# Patient Record
Sex: MALE | Race: WHITE | NOT HISPANIC OR LATINO
[De-identification: names, ages, dates, MRNs, and addresses within clinical notes are randomized per-mention and may not be internally consistent; named-entity substitution may affect disease eponyms.]

---

## 2017-02-16 ENCOUNTER — APPOINTMENT (OUTPATIENT)
Dept: RADIOLOGY | Facility: HOSPITAL | Age: 52
End: 2017-02-16

## 2017-02-16 ENCOUNTER — OUTPATIENT (OUTPATIENT)
Dept: OUTPATIENT SERVICES | Facility: HOSPITAL | Age: 52
LOS: 1 days | End: 2017-02-16
Payer: COMMERCIAL

## 2017-02-16 PROBLEM — Z00.00 ENCOUNTER FOR PREVENTIVE HEALTH EXAMINATION: Status: ACTIVE | Noted: 2017-02-16

## 2017-02-16 PROCEDURE — 71046 X-RAY EXAM CHEST 2 VIEWS: CPT

## 2017-02-16 PROCEDURE — 71020: CPT | Mod: 26

## 2017-07-06 ENCOUNTER — INPATIENT (INPATIENT)
Facility: HOSPITAL | Age: 52
LOS: 0 days | Discharge: HOME | DRG: 313 | End: 2017-07-07
Attending: HOSPITALIST | Admitting: HOSPITALIST
Payer: COMMERCIAL

## 2017-07-06 DIAGNOSIS — R07.89 OTHER CHEST PAIN: ICD-10-CM

## 2017-07-06 DIAGNOSIS — L40.9 PSORIASIS, UNSPECIFIED: ICD-10-CM

## 2017-07-06 DIAGNOSIS — F41.9 ANXIETY DISORDER, UNSPECIFIED: ICD-10-CM

## 2017-07-06 DIAGNOSIS — D72.829 ELEVATED WHITE BLOOD CELL COUNT, UNSPECIFIED: ICD-10-CM

## 2017-07-06 DIAGNOSIS — R07.9 CHEST PAIN, UNSPECIFIED: ICD-10-CM

## 2017-07-06 DIAGNOSIS — F17.210 NICOTINE DEPENDENCE, CIGARETTES, UNCOMPLICATED: ICD-10-CM

## 2017-07-06 PROCEDURE — 93306 TTE W/DOPPLER COMPLETE: CPT | Mod: 26

## 2017-07-06 PROCEDURE — 93010 ELECTROCARDIOGRAM REPORT: CPT | Mod: 59

## 2017-07-06 PROCEDURE — 99284 EMERGENCY DEPT VISIT MOD MDM: CPT

## 2017-07-06 PROCEDURE — 71010: CPT | Mod: 26

## 2017-07-06 PROCEDURE — 99223 1ST HOSP IP/OBS HIGH 75: CPT

## 2017-07-06 PROCEDURE — 71275 CT ANGIOGRAPHY CHEST: CPT | Mod: 26

## 2017-07-07 PROCEDURE — 99285 EMERGENCY DEPT VISIT HI MDM: CPT | Mod: 25

## 2017-07-07 PROCEDURE — 85379 FIBRIN DEGRADATION QUANT: CPT

## 2017-07-07 PROCEDURE — 83880 ASSAY OF NATRIURETIC PEPTIDE: CPT

## 2017-07-07 PROCEDURE — A9500: CPT

## 2017-07-07 PROCEDURE — 96374 THER/PROPH/DIAG INJ IV PUSH: CPT

## 2017-07-07 PROCEDURE — 93306 TTE W/DOPPLER COMPLETE: CPT

## 2017-07-07 PROCEDURE — 84484 ASSAY OF TROPONIN QUANT: CPT

## 2017-07-07 PROCEDURE — G0378: CPT

## 2017-07-07 PROCEDURE — 82553 CREATINE MB FRACTION: CPT

## 2017-07-07 PROCEDURE — 85610 PROTHROMBIN TIME: CPT

## 2017-07-07 PROCEDURE — 78452 HT MUSCLE IMAGE SPECT MULT: CPT | Mod: 26

## 2017-07-07 PROCEDURE — 82550 ASSAY OF CK (CPK): CPT

## 2017-07-07 PROCEDURE — 93016 CV STRESS TEST SUPVJ ONLY: CPT

## 2017-07-07 PROCEDURE — 85027 COMPLETE CBC AUTOMATED: CPT

## 2017-07-07 PROCEDURE — 93005 ELECTROCARDIOGRAM TRACING: CPT

## 2017-07-07 PROCEDURE — 71045 X-RAY EXAM CHEST 1 VIEW: CPT

## 2017-07-07 PROCEDURE — 80048 BASIC METABOLIC PNL TOTAL CA: CPT

## 2017-07-07 PROCEDURE — 71275 CT ANGIOGRAPHY CHEST: CPT

## 2017-07-07 PROCEDURE — 83735 ASSAY OF MAGNESIUM: CPT

## 2017-07-07 PROCEDURE — 85730 THROMBOPLASTIN TIME PARTIAL: CPT

## 2017-07-07 PROCEDURE — 80061 LIPID PANEL: CPT

## 2017-07-07 PROCEDURE — 84443 ASSAY THYROID STIM HORMONE: CPT

## 2017-07-07 PROCEDURE — 93018 CV STRESS TEST I&R ONLY: CPT

## 2017-07-07 PROCEDURE — 78452 HT MUSCLE IMAGE SPECT MULT: CPT

## 2017-07-07 PROCEDURE — 93017 CV STRESS TEST TRACING ONLY: CPT

## 2017-07-07 PROCEDURE — 80053 COMPREHEN METABOLIC PANEL: CPT

## 2017-07-07 PROCEDURE — 99238 HOSP IP/OBS DSCHRG MGMT 30/<: CPT

## 2017-07-07 PROCEDURE — 83036 HEMOGLOBIN GLYCOSYLATED A1C: CPT

## 2017-09-14 ENCOUNTER — OUTPATIENT (OUTPATIENT)
Dept: OUTPATIENT SERVICES | Facility: HOSPITAL | Age: 52
LOS: 1 days | End: 2017-09-14
Payer: COMMERCIAL

## 2017-09-14 ENCOUNTER — APPOINTMENT (OUTPATIENT)
Dept: MRI IMAGING | Facility: HOSPITAL | Age: 52
End: 2017-09-14

## 2017-09-14 ENCOUNTER — APPOINTMENT (OUTPATIENT)
Dept: CT IMAGING | Facility: HOSPITAL | Age: 52
End: 2017-09-14
Payer: COMMERCIAL

## 2017-09-14 DIAGNOSIS — Z00.8 ENCOUNTER FOR OTHER GENERAL EXAMINATION: ICD-10-CM

## 2017-09-14 DIAGNOSIS — D35.02 BENIGN NEOPLASM OF LEFT ADRENAL GLAND: ICD-10-CM

## 2017-09-14 PROCEDURE — 74170 CT ABD WO CNTRST FLWD CNTRST: CPT

## 2017-09-14 PROCEDURE — 74170 CT ABD WO CNTRST FLWD CNTRST: CPT | Mod: 26

## 2018-01-09 ENCOUNTER — EMERGENCY (EMERGENCY)
Facility: HOSPITAL | Age: 53
LOS: 1 days | Discharge: HOME | End: 2018-01-09
Admitting: EMERGENCY MEDICINE
Payer: COMMERCIAL
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
## 2018-01-09 DIAGNOSIS — F17.200 NICOTINE DEPENDENCE, UNSPECIFIED, UNCOMPLICATED: ICD-10-CM
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
## 2018-01-09 DIAGNOSIS — Z79.899 OTHER LONG TERM (CURRENT) DRUG THERAPY: ICD-10-CM
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
## 2018-01-09 DIAGNOSIS — S01.452A OPEN BITE OF LEFT CHEEK AND TEMPOROMANDIBULAR AREA, INITIAL ENCOUNTER: ICD-10-CM

## 2018-01-09 DIAGNOSIS — S01.81XA LACERATION WITHOUT FOREIGN BODY OF OTHER PART OF HEAD, INITIAL ENCOUNTER: ICD-10-CM

## 2018-01-09 PROCEDURE — 12011 RPR F/E/E/N/L/M 2.5 CM/<: CPT

## 2018-01-09 PROCEDURE — 99284 EMERGENCY DEPT VISIT MOD MDM: CPT

## 2018-01-09 PROCEDURE — 99284 EMERGENCY DEPT VISIT MOD MDM: CPT | Mod: 25

## 2018-01-09 PROCEDURE — 96374 THER/PROPH/DIAG INJ IV PUSH: CPT | Mod: XU

## 2018-01-10 ENCOUNTER — APPOINTMENT (OUTPATIENT)
Dept: INFECTIOUS DISEASE | Facility: CLINIC | Age: 53
End: 2018-01-10
Payer: COMMERCIAL

## 2018-01-10 VITALS
SYSTOLIC BLOOD PRESSURE: 166 MMHG | WEIGHT: 206 LBS | HEART RATE: 108 BPM | OXYGEN SATURATION: 92 % | HEIGHT: 71 IN | TEMPERATURE: 97.6 F | RESPIRATION RATE: 18 BRPM | BODY MASS INDEX: 28.84 KG/M2 | DIASTOLIC BLOOD PRESSURE: 94 MMHG

## 2018-01-10 DIAGNOSIS — F32.9 MAJOR DEPRESSIVE DISORDER, SINGLE EPISODE, UNSPECIFIED: ICD-10-CM

## 2018-01-10 DIAGNOSIS — F17.200 NICOTINE DEPENDENCE, UNSPECIFIED, UNCOMPLICATED: ICD-10-CM

## 2018-01-10 DIAGNOSIS — R21 RASH AND OTHER NONSPECIFIC SKIN ERUPTION: ICD-10-CM

## 2018-01-10 PROCEDURE — 99203 OFFICE O/P NEW LOW 30 MIN: CPT

## 2018-01-10 RX ORDER — SECUKINUMAB 150 MG/ML
INJECTION SUBCUTANEOUS
Refills: 0 | Status: ACTIVE | COMMUNITY

## 2018-01-10 RX ORDER — PHENELZINE SULFATE 15 MG/1
15 TABLET, FILM COATED ORAL
Refills: 0 | Status: ACTIVE | COMMUNITY

## 2018-01-10 RX ORDER — METHYLPHENIDATE HYDROCHLORIDE 10 MG/1
10 TABLET ORAL
Refills: 0 | Status: ACTIVE | COMMUNITY

## 2018-01-10 RX ORDER — CLONAZEPAM 2 MG/1
TABLET ORAL
Refills: 0 | Status: ACTIVE | COMMUNITY

## 2018-01-12 LAB
R RICKETTSI IGG CSF-ACNC: NEGATIVE
R RICKETTSI IGM CSF-ACNC: 0.14 INDEX
T PALLIDUM AB SER QL IA: NEGATIVE

## 2018-01-16 LAB
B HENSELAE IGG SER-ACNC: NORMAL TITER
B HENSELAE IGM SER QL: NORMAL TITER
B QUINTANA IGG SER QL: NORMAL TITER
B QUINTANA IGM SER QL: NORMAL TITER

## 2018-01-18 ENCOUNTER — MOBILE ON CALL (OUTPATIENT)
Age: 53
End: 2018-01-18

## 2018-01-18 ENCOUNTER — LABORATORY RESULT (OUTPATIENT)
Age: 53
End: 2018-01-18

## 2018-01-18 ENCOUNTER — APPOINTMENT (OUTPATIENT)
Dept: DERMATOLOGY | Facility: CLINIC | Age: 53
End: 2018-01-18
Payer: COMMERCIAL

## 2018-01-18 VITALS
SYSTOLIC BLOOD PRESSURE: 140 MMHG | DIASTOLIC BLOOD PRESSURE: 80 MMHG | WEIGHT: 204 LBS | HEIGHT: 71 IN | BODY MASS INDEX: 28.56 KG/M2

## 2018-01-18 DIAGNOSIS — Z87.2 PERSONAL HISTORY OF DISEASES OF THE SKIN AND SUBCUTANEOUS TISSUE: ICD-10-CM

## 2018-01-18 DIAGNOSIS — F41.9 ANXIETY DISORDER, UNSPECIFIED: ICD-10-CM

## 2018-01-18 PROCEDURE — 11100 BX SKIN SUBCUTANEOUS&/MUCOUS MEMBRANE 1 LESION: CPT

## 2018-01-18 PROCEDURE — 11101 BIOPSY SKIN SUBQ&/MUCOUS MEMBRANE EA ADDL LESN: CPT

## 2018-01-18 PROCEDURE — 99203 OFFICE O/P NEW LOW 30 MIN: CPT | Mod: 25

## 2018-01-18 RX ORDER — TESTOSTERONE ENANTHATE 200 MG/ML
200 INJECTION, SOLUTION INTRAMUSCULAR
Qty: 5 | Refills: 0 | Status: ACTIVE | COMMUNITY
Start: 2017-07-18

## 2018-01-18 RX ORDER — HYDROXYZINE HYDROCHLORIDE 25 MG/1
25 TABLET ORAL
Qty: 30 | Refills: 0 | Status: ACTIVE | COMMUNITY
Start: 2017-11-22

## 2018-01-18 RX ORDER — PHENELZINE SULFATE 15 MG/1
TABLET, FILM COATED ORAL
Refills: 0 | Status: ACTIVE | COMMUNITY

## 2018-02-01 ENCOUNTER — MOBILE ON CALL (OUTPATIENT)
Age: 53
End: 2018-02-01

## 2018-02-01 ENCOUNTER — APPOINTMENT (OUTPATIENT)
Dept: DERMATOLOGY | Facility: CLINIC | Age: 53
End: 2018-02-01
Payer: COMMERCIAL

## 2018-02-01 DIAGNOSIS — L30.9 DERMATITIS, UNSPECIFIED: ICD-10-CM

## 2018-02-01 PROCEDURE — 99214 OFFICE O/P EST MOD 30 MIN: CPT

## 2018-02-28 RX ORDER — MYCOPHENOLATE MOFETIL 250 MG/1
CAPSULE ORAL
Refills: 0 | Status: DISCONTINUED | COMMUNITY
End: 2018-02-28

## 2018-03-01 RX ORDER — MOMETASONE FUROATE 1 MG/G
0.1 CREAM TOPICAL
Qty: 1 | Refills: 1 | Status: ACTIVE | COMMUNITY
Start: 2018-02-01 | End: 1900-01-01

## 2018-04-02 ENCOUNTER — APPOINTMENT (OUTPATIENT)
Dept: DERMATOLOGY | Facility: CLINIC | Age: 53
End: 2018-04-02

## 2018-04-04 ENCOUNTER — APPOINTMENT (OUTPATIENT)
Dept: DERMATOLOGY | Facility: CLINIC | Age: 53
End: 2018-04-04

## 2018-04-06 ENCOUNTER — APPOINTMENT (OUTPATIENT)
Dept: DERMATOLOGY | Facility: CLINIC | Age: 53
End: 2018-04-06

## 2018-04-09 ENCOUNTER — APPOINTMENT (OUTPATIENT)
Dept: DERMATOLOGY | Facility: CLINIC | Age: 53
End: 2018-04-09

## 2019-02-21 ENCOUNTER — APPOINTMENT (OUTPATIENT)
Age: 54
End: 2019-02-21
Payer: COMMERCIAL

## 2019-02-21 ENCOUNTER — OUTPATIENT (OUTPATIENT)
Dept: OUTPATIENT SERVICES | Facility: HOSPITAL | Age: 54
LOS: 1 days | End: 2019-02-21
Payer: COMMERCIAL

## 2019-02-21 DIAGNOSIS — Z00.8 ENCOUNTER FOR OTHER GENERAL EXAMINATION: ICD-10-CM

## 2019-02-21 PROCEDURE — 76870 US EXAM SCROTUM: CPT | Mod: 26

## 2019-02-21 PROCEDURE — 76870 US EXAM SCROTUM: CPT

## 2019-02-25 ENCOUNTER — APPOINTMENT (OUTPATIENT)
Dept: CT IMAGING | Facility: CLINIC | Age: 54
End: 2019-02-25

## 2019-05-30 ENCOUNTER — EMERGENCY (EMERGENCY)
Facility: HOSPITAL | Age: 54
LOS: 1 days | Discharge: HOME | End: 2019-05-30
Attending: EMERGENCY MEDICINE | Admitting: EMERGENCY MEDICINE
Payer: COMMERCIAL

## 2019-05-30 ENCOUNTER — TRANSCRIPTION ENCOUNTER (OUTPATIENT)
Age: 54
End: 2019-05-30

## 2019-05-30 VITALS
DIASTOLIC BLOOD PRESSURE: 76 MMHG | RESPIRATION RATE: 18 BRPM | SYSTOLIC BLOOD PRESSURE: 130 MMHG | TEMPERATURE: 98 F | OXYGEN SATURATION: 98 % | HEART RATE: 89 BPM

## 2019-05-30 VITALS
OXYGEN SATURATION: 95 % | HEIGHT: 69 IN | TEMPERATURE: 99 F | SYSTOLIC BLOOD PRESSURE: 150 MMHG | RESPIRATION RATE: 18 BRPM | DIASTOLIC BLOOD PRESSURE: 82 MMHG | WEIGHT: 188.94 LBS | HEART RATE: 92 BPM

## 2019-05-30 LAB
ALBUMIN SERPL ELPH-MCNC: 3.3 G/DL — SIGNIFICANT CHANGE UP (ref 3.3–5)
ALP SERPL-CCNC: 93 U/L — SIGNIFICANT CHANGE UP (ref 40–120)
ALT FLD-CCNC: 20 U/L DA — SIGNIFICANT CHANGE UP (ref 10–45)
ANION GAP SERPL CALC-SCNC: 11 MMOL/L — SIGNIFICANT CHANGE UP (ref 5–17)
APPEARANCE UR: CLEAR — SIGNIFICANT CHANGE UP
APTT BLD: 34.7 SEC — SIGNIFICANT CHANGE UP (ref 27.5–36.3)
AST SERPL-CCNC: 18 U/L — SIGNIFICANT CHANGE UP (ref 10–40)
BASOPHILS # BLD AUTO: 0.06 K/UL — SIGNIFICANT CHANGE UP (ref 0–0.2)
BASOPHILS NFR BLD AUTO: 0.4 % — SIGNIFICANT CHANGE UP (ref 0–2)
BILIRUB SERPL-MCNC: 0.6 MG/DL — SIGNIFICANT CHANGE UP (ref 0.2–1.2)
BILIRUB UR-MCNC: NEGATIVE — SIGNIFICANT CHANGE UP
BUN SERPL-MCNC: 17 MG/DL — SIGNIFICANT CHANGE UP (ref 7–23)
CALCIUM SERPL-MCNC: 8.8 MG/DL — SIGNIFICANT CHANGE UP (ref 8.4–10.5)
CHLORIDE SERPL-SCNC: 96 MMOL/L — SIGNIFICANT CHANGE UP (ref 96–108)
CO2 SERPL-SCNC: 26 MMOL/L — SIGNIFICANT CHANGE UP (ref 22–31)
COLOR SPEC: YELLOW — SIGNIFICANT CHANGE UP
CREAT SERPL-MCNC: 0.95 MG/DL — SIGNIFICANT CHANGE UP (ref 0.5–1.3)
DIFF PNL FLD: NEGATIVE — SIGNIFICANT CHANGE UP
EOSINOPHIL # BLD AUTO: 0.2 K/UL — SIGNIFICANT CHANGE UP (ref 0–0.5)
EOSINOPHIL NFR BLD AUTO: 1.4 % — SIGNIFICANT CHANGE UP (ref 0–6)
GLUCOSE SERPL-MCNC: 87 MG/DL — SIGNIFICANT CHANGE UP (ref 70–99)
GLUCOSE UR QL: NEGATIVE — SIGNIFICANT CHANGE UP
HCT VFR BLD CALC: 44 % — SIGNIFICANT CHANGE UP (ref 39–50)
HGB BLD-MCNC: 15.3 G/DL — SIGNIFICANT CHANGE UP (ref 13–17)
IMM GRANULOCYTES NFR BLD AUTO: 0.6 % — SIGNIFICANT CHANGE UP (ref 0–1.5)
INR BLD: 1.08 RATIO — SIGNIFICANT CHANGE UP (ref 0.88–1.16)
KETONES UR-MCNC: NEGATIVE — SIGNIFICANT CHANGE UP
LACTATE SERPL-SCNC: 0.5 MMOL/L — LOW (ref 0.7–2)
LEUKOCYTE ESTERASE UR-ACNC: NEGATIVE — SIGNIFICANT CHANGE UP
LIDOCAIN IGE QN: 36 U/L — LOW (ref 73–393)
LYMPHOCYTES # BLD AUTO: 1.86 K/UL — SIGNIFICANT CHANGE UP (ref 1–3.3)
LYMPHOCYTES # BLD AUTO: 13.3 % — SIGNIFICANT CHANGE UP (ref 13–44)
MCHC RBC-ENTMCNC: 32 PG — SIGNIFICANT CHANGE UP (ref 27–34)
MCHC RBC-ENTMCNC: 34.8 GM/DL — SIGNIFICANT CHANGE UP (ref 32–36)
MCV RBC AUTO: 92.1 FL — SIGNIFICANT CHANGE UP (ref 80–100)
MONOCYTES # BLD AUTO: 1.13 K/UL — HIGH (ref 0–0.9)
MONOCYTES NFR BLD AUTO: 8.1 % — SIGNIFICANT CHANGE UP (ref 2–14)
NEUTROPHILS # BLD AUTO: 10.67 K/UL — HIGH (ref 1.8–7.4)
NEUTROPHILS NFR BLD AUTO: 76.2 % — SIGNIFICANT CHANGE UP (ref 43–77)
NITRITE UR-MCNC: NEGATIVE — SIGNIFICANT CHANGE UP
NRBC # BLD: 0 /100 WBCS — SIGNIFICANT CHANGE UP (ref 0–0)
PH UR: 7 — SIGNIFICANT CHANGE UP (ref 5–8)
PLATELET # BLD AUTO: 210 K/UL — SIGNIFICANT CHANGE UP (ref 150–400)
POTASSIUM SERPL-MCNC: 3.5 MMOL/L — SIGNIFICANT CHANGE UP (ref 3.5–5.3)
POTASSIUM SERPL-SCNC: 3.5 MMOL/L — SIGNIFICANT CHANGE UP (ref 3.5–5.3)
PROCALCITONIN SERPL-MCNC: 0.03 NG/ML — SIGNIFICANT CHANGE UP
PROT SERPL-MCNC: 7.1 G/DL — SIGNIFICANT CHANGE UP (ref 6–8.3)
PROT UR-MCNC: NEGATIVE — SIGNIFICANT CHANGE UP
PROTHROM AB SERPL-ACNC: 12.1 SEC — SIGNIFICANT CHANGE UP (ref 10–12.9)
RBC # BLD: 4.78 M/UL — SIGNIFICANT CHANGE UP (ref 4.2–5.8)
RBC # FLD: 12.7 % — SIGNIFICANT CHANGE UP (ref 10.3–14.5)
SODIUM SERPL-SCNC: 133 MMOL/L — LOW (ref 135–145)
SP GR SPEC: 1 — LOW (ref 1.01–1.02)
UROBILINOGEN FLD QL: NEGATIVE — SIGNIFICANT CHANGE UP
WBC # BLD: 14.01 K/UL — HIGH (ref 3.8–10.5)
WBC # FLD AUTO: 14.01 K/UL — HIGH (ref 3.8–10.5)

## 2019-05-30 PROCEDURE — 71045 X-RAY EXAM CHEST 1 VIEW: CPT

## 2019-05-30 PROCEDURE — 99284 EMERGENCY DEPT VISIT MOD MDM: CPT | Mod: 25

## 2019-05-30 PROCEDURE — 83690 ASSAY OF LIPASE: CPT

## 2019-05-30 PROCEDURE — 99284 EMERGENCY DEPT VISIT MOD MDM: CPT

## 2019-05-30 PROCEDURE — 96365 THER/PROPH/DIAG IV INF INIT: CPT | Mod: XU

## 2019-05-30 PROCEDURE — 87086 URINE CULTURE/COLONY COUNT: CPT

## 2019-05-30 PROCEDURE — 85730 THROMBOPLASTIN TIME PARTIAL: CPT

## 2019-05-30 PROCEDURE — 87040 BLOOD CULTURE FOR BACTERIA: CPT

## 2019-05-30 PROCEDURE — 74177 CT ABD & PELVIS W/CONTRAST: CPT

## 2019-05-30 PROCEDURE — 83605 ASSAY OF LACTIC ACID: CPT

## 2019-05-30 PROCEDURE — 93010 ELECTROCARDIOGRAM REPORT: CPT

## 2019-05-30 PROCEDURE — 96375 TX/PRO/DX INJ NEW DRUG ADDON: CPT

## 2019-05-30 PROCEDURE — 85610 PROTHROMBIN TIME: CPT

## 2019-05-30 PROCEDURE — 85027 COMPLETE CBC AUTOMATED: CPT

## 2019-05-30 PROCEDURE — 84145 PROCALCITONIN (PCT): CPT

## 2019-05-30 PROCEDURE — 93005 ELECTROCARDIOGRAM TRACING: CPT

## 2019-05-30 PROCEDURE — 71045 X-RAY EXAM CHEST 1 VIEW: CPT | Mod: 26

## 2019-05-30 PROCEDURE — 96361 HYDRATE IV INFUSION ADD-ON: CPT

## 2019-05-30 PROCEDURE — 80053 COMPREHEN METABOLIC PANEL: CPT

## 2019-05-30 PROCEDURE — 74177 CT ABD & PELVIS W/CONTRAST: CPT | Mod: 26

## 2019-05-30 PROCEDURE — 36415 COLL VENOUS BLD VENIPUNCTURE: CPT

## 2019-05-30 RX ORDER — ONDANSETRON 8 MG/1
4 TABLET, FILM COATED ORAL ONCE
Refills: 0 | Status: COMPLETED | OUTPATIENT
Start: 2019-05-30 | End: 2019-05-30

## 2019-05-30 RX ORDER — SODIUM CHLORIDE 9 MG/ML
1000 INJECTION INTRAMUSCULAR; INTRAVENOUS; SUBCUTANEOUS ONCE
Refills: 0 | Status: COMPLETED | OUTPATIENT
Start: 2019-05-30 | End: 2019-05-30

## 2019-05-30 RX ORDER — SODIUM CHLORIDE 9 MG/ML
500 INJECTION INTRAMUSCULAR; INTRAVENOUS; SUBCUTANEOUS ONCE
Refills: 0 | Status: COMPLETED | OUTPATIENT
Start: 2019-05-30 | End: 2019-05-30

## 2019-05-30 RX ORDER — CIPROFLOXACIN LACTATE 400MG/40ML
400 VIAL (ML) INTRAVENOUS ONCE
Refills: 0 | Status: COMPLETED | OUTPATIENT
Start: 2019-05-30 | End: 2019-05-30

## 2019-05-30 RX ORDER — FAMOTIDINE 10 MG/ML
20 INJECTION INTRAVENOUS ONCE
Refills: 0 | Status: COMPLETED | OUTPATIENT
Start: 2019-05-30 | End: 2019-05-30

## 2019-05-30 RX ORDER — METRONIDAZOLE 500 MG
1 TABLET ORAL
Qty: 30 | Refills: 0
Start: 2019-05-30 | End: 2019-06-08

## 2019-05-30 RX ORDER — METRONIDAZOLE 500 MG
500 TABLET ORAL ONCE
Refills: 0 | Status: COMPLETED | OUTPATIENT
Start: 2019-05-30 | End: 2019-05-30

## 2019-05-30 RX ORDER — MOXIFLOXACIN HYDROCHLORIDE TABLETS, 400 MG 400 MG/1
1 TABLET, FILM COATED ORAL
Qty: 20 | Refills: 0
Start: 2019-05-30 | End: 2019-06-08

## 2019-05-30 RX ADMIN — ONDANSETRON 4 MILLIGRAM(S): 8 TABLET, FILM COATED ORAL at 18:45

## 2019-05-30 RX ADMIN — Medication 200 MILLIGRAM(S): at 20:43

## 2019-05-30 RX ADMIN — SODIUM CHLORIDE 500 MILLILITER(S): 9 INJECTION INTRAMUSCULAR; INTRAVENOUS; SUBCUTANEOUS at 20:30

## 2019-05-30 RX ADMIN — FAMOTIDINE 20 MILLIGRAM(S): 10 INJECTION INTRAVENOUS at 18:30

## 2019-05-30 RX ADMIN — Medication 500 MILLIGRAM(S): at 20:35

## 2019-05-30 RX ADMIN — SODIUM CHLORIDE 1000 MILLILITER(S): 9 INJECTION INTRAMUSCULAR; INTRAVENOUS; SUBCUTANEOUS at 20:57

## 2019-05-30 RX ADMIN — SODIUM CHLORIDE 1000 MILLILITER(S): 9 INJECTION INTRAMUSCULAR; INTRAVENOUS; SUBCUTANEOUS at 19:19

## 2019-05-30 RX ADMIN — SODIUM CHLORIDE 1000 MILLILITER(S): 9 INJECTION INTRAMUSCULAR; INTRAVENOUS; SUBCUTANEOUS at 18:45

## 2019-05-30 RX ADMIN — Medication 100 MILLIGRAM(S): at 19:35

## 2019-05-30 RX ADMIN — FAMOTIDINE 100 MILLIGRAM(S): 10 INJECTION INTRAVENOUS at 18:45

## 2019-05-30 NOTE — ED PROVIDER NOTE - ATTENDING CONTRIBUTION TO CARE
Dr. Roger: I performed a face to face bedside interview with patient regarding history of present illness, review of symptoms and past medical history. I completed an independent physical exam.  I have discussed patient's plan of care with PA.   I agree with note as stated above, having amended the EMR as needed to reflect my findings.   This includes HISTORY OF PRESENT ILLNESS, HIV, PAST MEDICAL/SURGICAL/FAMILY/SOCIAL HISTORY, ALLERGIES AND HOME MEDICATIONS, REVIEW OF SYSTEMS, PHYSICAL EXAM, and any PROGRESS NOTES during the time I functioned as the attending physician for this patient.    53M on ritalin, klonopin p/w lower abdo pain bilaterally x 3 days after eating a turkey burger. Initially had multiple episodes of diarrhea (watery) and NBNB, as well as fever T 101, which has since resolved. Lower abdo pain worsened, so saw UC yesterday who sent him to the ED. Since this am pt has not been able to ambulate. No abdo surgeries.    On exam pt is well appearing, nad, dry mucosa, rrr, ctab, +bilateral lower abdo ttp    Plan per mdm

## 2019-05-30 NOTE — ED PROVIDER NOTE - PROGRESS NOTE DETAILS
Sidle: pts CT concerning for colitis. discussed with pt results. able to urinate with no issue. tolerating PO water. will send home with PO abx. will f/u with GI

## 2019-05-30 NOTE — ED PROVIDER NOTE - CARE PROVIDER_API CALL
Cory Natarajan (MD)  Gastroenterology; Internal Medicine  [address] [place] [address]  Phone: [phone]  Fax: [phone]  Follow Up Time:

## 2019-05-30 NOTE — ED PROVIDER NOTE - NSFOLLOWUPINSTRUCTIONS_ED_ALL_ED_FT
Follow up with the primary care provider in 24-48 hours  Follow up with the gastroenterologist next week.  Take your antibiotics as prescribed.    SEEK IMMEDIATE MEDICAL CARE IF YOU HAVE ANY OF THE FOLLOWING SYMPTOMS: worsening abdominal pain, high fever, inability to hold down liquids or medication, black or bloody stools, inability to pass gas, lightheadedness/dizziness, or a change in mental status.   Any worsening of symptoms or new concerning symptoms return to the ED

## 2019-05-30 NOTE — ED PROVIDER NOTE - CARE PLAN
Principal Discharge DX:	Food poisoning, accidental or unintentional, initial encounter  Secondary Diagnosis:	Lower abdominal pain Principal Discharge DX:	Colitis  Secondary Diagnosis:	Lower abdominal pain

## 2019-05-30 NOTE — ED PROVIDER NOTE - CLINICAL SUMMARY MEDICAL DECISION MAKING FREE TEXT BOX
Pt p/w lower abdo pain, nausea, vomiting s/p turkey burger. Appears dehydrated and unable to urinate since this am. Will hydrate, check labs and CT a/p r/o colitis

## 2019-05-31 LAB
CULTURE RESULTS: SIGNIFICANT CHANGE UP
SPECIMEN SOURCE: SIGNIFICANT CHANGE UP

## 2019-06-05 LAB
CULTURE RESULTS: SIGNIFICANT CHANGE UP
CULTURE RESULTS: SIGNIFICANT CHANGE UP
SPECIMEN SOURCE: SIGNIFICANT CHANGE UP
SPECIMEN SOURCE: SIGNIFICANT CHANGE UP

## 2019-12-24 ENCOUNTER — OUTPATIENT (OUTPATIENT)
Dept: OUTPATIENT SERVICES | Facility: HOSPITAL | Age: 54
LOS: 1 days | End: 2019-12-24
Payer: SELF-PAY

## 2019-12-24 VITALS
DIASTOLIC BLOOD PRESSURE: 91 MMHG | HEIGHT: 69.5 IN | HEART RATE: 88 BPM | RESPIRATION RATE: 16 BRPM | WEIGHT: 204.15 LBS | SYSTOLIC BLOOD PRESSURE: 157 MMHG | OXYGEN SATURATION: 96 % | TEMPERATURE: 98 F

## 2019-12-24 DIAGNOSIS — F17.200 NICOTINE DEPENDENCE, UNSPECIFIED, UNCOMPLICATED: ICD-10-CM

## 2019-12-24 DIAGNOSIS — Z98.890 OTHER SPECIFIED POSTPROCEDURAL STATES: Chronic | ICD-10-CM

## 2019-12-24 DIAGNOSIS — F41.9 ANXIETY DISORDER, UNSPECIFIED: ICD-10-CM

## 2019-12-24 DIAGNOSIS — R21 RASH AND OTHER NONSPECIFIC SKIN ERUPTION: ICD-10-CM

## 2019-12-24 DIAGNOSIS — Z01.818 ENCOUNTER FOR OTHER PREPROCEDURAL EXAMINATION: ICD-10-CM

## 2019-12-24 DIAGNOSIS — D21.0 BENIGN NEOPLASM OF CONNECTIVE AND OTHER SOFT TISSUE OF HEAD, FACE AND NECK: ICD-10-CM

## 2019-12-24 LAB
ALBUMIN SERPL ELPH-MCNC: 4.2 G/DL — SIGNIFICANT CHANGE UP (ref 3.3–5)
ALP SERPL-CCNC: 87 U/L — SIGNIFICANT CHANGE UP (ref 40–120)
ALT FLD-CCNC: 22 U/L — SIGNIFICANT CHANGE UP (ref 10–45)
ANION GAP SERPL CALC-SCNC: 9 MMOL/L — SIGNIFICANT CHANGE UP (ref 5–17)
APTT BLD: 39.1 SEC — HIGH (ref 27.5–36.3)
AST SERPL-CCNC: 24 U/L — SIGNIFICANT CHANGE UP (ref 10–40)
BILIRUB SERPL-MCNC: 0.4 MG/DL — SIGNIFICANT CHANGE UP (ref 0.2–1.2)
BUN SERPL-MCNC: 19 MG/DL — SIGNIFICANT CHANGE UP (ref 7–23)
CALCIUM SERPL-MCNC: 8.8 MG/DL — SIGNIFICANT CHANGE UP (ref 8.4–10.5)
CHLORIDE SERPL-SCNC: 99 MMOL/L — SIGNIFICANT CHANGE UP (ref 96–108)
CO2 SERPL-SCNC: 27 MMOL/L — SIGNIFICANT CHANGE UP (ref 22–31)
CREAT SERPL-MCNC: 1.06 MG/DL — SIGNIFICANT CHANGE UP (ref 0.5–1.3)
GLUCOSE SERPL-MCNC: 103 MG/DL — HIGH (ref 70–99)
HCT VFR BLD CALC: 51.1 % — HIGH (ref 39–50)
HGB BLD-MCNC: 17.6 G/DL — HIGH (ref 13–17)
INR BLD: 0.91 RATIO — SIGNIFICANT CHANGE UP (ref 0.88–1.16)
MCHC RBC-ENTMCNC: 31.8 PG — SIGNIFICANT CHANGE UP (ref 27–34)
MCHC RBC-ENTMCNC: 34.4 GM/DL — SIGNIFICANT CHANGE UP (ref 32–36)
MCV RBC AUTO: 92.2 FL — SIGNIFICANT CHANGE UP (ref 80–100)
NRBC # BLD: 0 /100 WBCS — SIGNIFICANT CHANGE UP (ref 0–0)
PLATELET # BLD AUTO: 228 K/UL — SIGNIFICANT CHANGE UP (ref 150–400)
POTASSIUM SERPL-MCNC: 3.8 MMOL/L — SIGNIFICANT CHANGE UP (ref 3.5–5.3)
POTASSIUM SERPL-SCNC: 3.8 MMOL/L — SIGNIFICANT CHANGE UP (ref 3.5–5.3)
PROT SERPL-MCNC: 7.5 G/DL — SIGNIFICANT CHANGE UP (ref 6–8.3)
PROTHROM AB SERPL-ACNC: 10.2 SEC — SIGNIFICANT CHANGE UP (ref 10–12.9)
RBC # BLD: 5.54 M/UL — SIGNIFICANT CHANGE UP (ref 4.2–5.8)
RBC # FLD: 12.3 % — SIGNIFICANT CHANGE UP (ref 10.3–14.5)
SODIUM SERPL-SCNC: 135 MMOL/L — SIGNIFICANT CHANGE UP (ref 135–145)
WBC # BLD: 10.18 K/UL — SIGNIFICANT CHANGE UP (ref 3.8–10.5)
WBC # FLD AUTO: 10.18 K/UL — SIGNIFICANT CHANGE UP (ref 3.8–10.5)

## 2019-12-24 PROCEDURE — 85730 THROMBOPLASTIN TIME PARTIAL: CPT

## 2019-12-24 PROCEDURE — 85610 PROTHROMBIN TIME: CPT

## 2019-12-24 PROCEDURE — 80053 COMPREHEN METABOLIC PANEL: CPT

## 2019-12-24 PROCEDURE — 93005 ELECTROCARDIOGRAM TRACING: CPT

## 2019-12-24 PROCEDURE — 85027 COMPLETE CBC AUTOMATED: CPT

## 2019-12-24 PROCEDURE — G0463: CPT

## 2019-12-24 PROCEDURE — 93010 ELECTROCARDIOGRAM REPORT: CPT | Mod: NC

## 2019-12-24 PROCEDURE — 36415 COLL VENOUS BLD VENIPUNCTURE: CPT

## 2019-12-24 NOTE — H&P PST ADULT - NSICDXPASTSURGICALHX_GEN_ALL_CORE_FT
PAST SURGICAL HISTORY:  H/O neck surgery cosmetic for loose skin    H/O oral surgery     H/O shoulder surgery right sgoulder - labrum surgery PAST SURGICAL HISTORY:  H/O neck surgery cosmetic for loose skin    H/O oral surgery     H/O shoulder surgery right shoulder - labrum surgery

## 2019-12-24 NOTE — H&P PST ADULT - MUSCULOSKELETAL
negative detailed exam ROM intact/normal strength/no joint erythema/no joint warmth/no joint swelling/no calf tenderness

## 2019-12-24 NOTE — H&P PST ADULT - SKIN COMMENTS
old scar to left cheek, cyst to left cheek old scar to left cheek, cyst to left cheek and rash to neck

## 2019-12-24 NOTE — H&P PST ADULT - SKIN/BREAST COMMENTS
c/o loose skin to both eyelids, old scar from to left cheek and left cheek cyst c/o loose skin to both eyelids, left cheek old scar and left cheek cyst

## 2019-12-24 NOTE — H&P PST ADULT - NSICDXPROBLEM_GEN_ALL_CORE_FT
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 Pt verbalized understanding  Pepcid given for GI prophylaxis  Pending: Medical clearance - elevated BP no dx of HTN + requested by surgeon  Pending: Dermatology clearance - rash to neck    Problem: Rash  Assessment and Plan: Rash to neck - pending Dermatology clearance     Problem: Anxiety and depression  Assessment and Plan: Pt instructed to take meds as prescribed     Problem: Smoker  Assessment and Plan: Smoking cessation encouraged

## 2019-12-24 NOTE — H&P PST ADULT - NEGATIVE PSYCHIATRIC SYMPTOMS
no auditory hallucinations/no insomnia/no memory loss/no visual hallucinations/no hyperactivity/no suicidal ideation

## 2019-12-24 NOTE — H&P PST ADULT - NSANTHOSAYNRD_GEN_A_CORE
No. MOHIT screening performed.  STOP BANG Legend: 0-2 = LOW Risk; 3-4 = INTERMEDIATE Risk; 5-8 = HIGH Risk/neck 17 3/4inches

## 2020-01-05 ENCOUNTER — TRANSCRIPTION ENCOUNTER (OUTPATIENT)
Age: 55
End: 2020-01-05

## 2020-01-06 ENCOUNTER — RESULT REVIEW (OUTPATIENT)
Age: 55
End: 2020-01-06

## 2020-01-06 ENCOUNTER — OUTPATIENT (OUTPATIENT)
Dept: OUTPATIENT SERVICES | Facility: HOSPITAL | Age: 55
LOS: 1 days | Discharge: HOME | End: 2020-01-06
Payer: SELF-PAY

## 2020-01-06 VITALS
DIASTOLIC BLOOD PRESSURE: 85 MMHG | OXYGEN SATURATION: 96 % | RESPIRATION RATE: 16 BRPM | SYSTOLIC BLOOD PRESSURE: 158 MMHG | TEMPERATURE: 98 F | HEART RATE: 79 BPM

## 2020-01-06 VITALS
HEART RATE: 89 BPM | RESPIRATION RATE: 20 BRPM | OXYGEN SATURATION: 95 % | HEIGHT: 69.5 IN | SYSTOLIC BLOOD PRESSURE: 124 MMHG | WEIGHT: 204.15 LBS | DIASTOLIC BLOOD PRESSURE: 80 MMHG | TEMPERATURE: 98 F

## 2020-01-06 DIAGNOSIS — Z98.890 OTHER SPECIFIED POSTPROCEDURAL STATES: Chronic | ICD-10-CM

## 2020-01-06 DIAGNOSIS — D21.0 BENIGN NEOPLASM OF CONNECTIVE AND OTHER SOFT TISSUE OF HEAD, FACE AND NECK: ICD-10-CM

## 2020-01-06 PROCEDURE — 15820 BLEPHAROPLASTY LOWER EYELID: CPT | Mod: 50

## 2020-01-06 PROCEDURE — 15822 BLEPHAROPLASTY UPPER EYELID: CPT | Mod: 50

## 2020-01-06 PROCEDURE — 14040 TIS TRNFR F/C/C/M/N/A/G/H/F: CPT

## 2020-01-06 PROCEDURE — 11442 EXC FACE-MM B9+MARG 1.1-2 CM: CPT

## 2020-01-06 RX ORDER — SODIUM CHLORIDE 9 MG/ML
1000 INJECTION, SOLUTION INTRAVENOUS
Refills: 0 | Status: DISCONTINUED | OUTPATIENT
Start: 2020-01-06 | End: 2020-01-06

## 2020-01-06 RX ORDER — ONDANSETRON 8 MG/1
4 TABLET, FILM COATED ORAL ONCE
Refills: 0 | Status: DISCONTINUED | OUTPATIENT
Start: 2020-01-06 | End: 2020-01-06

## 2020-01-06 RX ORDER — PHENELZINE SULFATE 15 MG/1
3 TABLET, FILM COATED ORAL
Qty: 0 | Refills: 0 | DISCHARGE

## 2020-01-06 RX ORDER — CLONAZEPAM 1 MG
0 TABLET ORAL
Qty: 0 | Refills: 0 | DISCHARGE

## 2020-01-06 RX ORDER — PHENELZINE SULFATE 15 MG/1
1 TABLET, FILM COATED ORAL
Qty: 0 | Refills: 0 | DISCHARGE

## 2020-01-06 RX ORDER — METHYLPHENIDATE HCL 5 MG
0 TABLET ORAL
Qty: 0 | Refills: 0 | DISCHARGE

## 2020-01-06 RX ORDER — FENTANYL CITRATE 50 UG/ML
25 INJECTION INTRAVENOUS
Refills: 0 | Status: DISCONTINUED | OUTPATIENT
Start: 2020-01-06 | End: 2020-01-06

## 2020-01-06 RX ORDER — ACETAMINOPHEN 500 MG
650 TABLET ORAL EVERY 6 HOURS
Refills: 0 | Status: DISCONTINUED | OUTPATIENT
Start: 2020-01-06 | End: 2020-02-03

## 2020-01-06 RX ADMIN — FENTANYL CITRATE 25 MICROGRAM(S): 50 INJECTION INTRAVENOUS at 14:01

## 2020-01-06 RX ADMIN — SODIUM CHLORIDE 50 MILLILITER(S): 9 INJECTION, SOLUTION INTRAVENOUS at 09:11

## 2020-01-06 RX ADMIN — FENTANYL CITRATE 25 MICROGRAM(S): 50 INJECTION INTRAVENOUS at 14:15

## 2020-01-06 NOTE — BRIEF OPERATIVE NOTE - NSICDXBRIEFPROCEDURE_GEN_ALL_CORE_FT
PROCEDURES:  Blepharoplasty, upper and lower eyelids 06-Jan-2020 13:18:08 left cheek cyst excision , left cheek scar revision Liza Gutiérrez

## 2020-01-06 NOTE — ASU DISCHARGE PLAN (ADULT/PEDIATRIC) - ASU DC SPECIAL INSTRUCTIONSFT
May discharge this patient home from ASU when meets criteria   follow up with Dr Camara on Wednesday  do not get face wet or touch incisions , may apply bacitracin daily to left cheek   take Tylenol over the counter for pain management   sit, sleep with head of bed elevated   take antibiotics as prescribed May discharge this patient home from ASU when meets criteria   follow up with Dr Camara on Wednesday  do not get face wet or touch incisions , may apply bacitracin daily to left cheek   take Tylenol over the counter for pain management   sit, sleep with head of bed elevated   take antibiotics as prescribed, percocet sent to pharmacy do not mix with tylenol, but should switch to tylenol for pain relief .

## 2020-01-06 NOTE — ASU PATIENT PROFILE, ADULT - PSH
H/O neck surgery  cosmetic for loose skin  H/O oral surgery    H/O shoulder surgery  right sgoulder - labrum surgery

## 2020-01-06 NOTE — PRE-ANESTHESIA EVALUATION ADULT - NSANTHADDINFOFT_GEN_ALL_CORE
Pt. on long term MAOI therapy. Discussed GA in detail with patient and surgeon and all questions sought and answered. Pt. agrees to all plans and wishes to proceed with surgical.    Dermatology/medical evaluation/optimization and clearance noted and appreciated.

## 2020-01-06 NOTE — ASU DISCHARGE PLAN (ADULT/PEDIATRIC) - CARE PROVIDER_API CALL
Andrew Camara)  Plastic Surgery; Surgery  [address] [facility], [address]  [place] [address]  Phone: [phone]  Fax: [phone]  Follow Up Time:

## 2020-01-08 LAB — SURGICAL PATHOLOGY STUDY: SIGNIFICANT CHANGE UP

## 2020-04-03 DIAGNOSIS — H01.009 UNSPECIFIED BLEPHARITIS UNSPECIFIED EYE, UNSPECIFIED EYELID: ICD-10-CM

## 2020-04-03 RX ORDER — TOBRAMYCIN 3 MG/ML
0.3 SOLUTION/ DROPS OPHTHALMIC 4 TIMES DAILY
Qty: 1 | Refills: 3 | Status: ACTIVE | COMMUNITY
Start: 2020-04-03 | End: 1900-01-01

## 2020-05-13 LAB
SARS-COV-2 IGG SERPL IA-ACNC: 0 INDEX
SARS-COV-2 IGG SERPL QL IA: NEGATIVE

## 2020-05-26 ENCOUNTER — TRANSCRIPTION ENCOUNTER (OUTPATIENT)
Age: 55
End: 2020-05-26

## 2020-06-22 ENCOUNTER — TRANSCRIPTION ENCOUNTER (OUTPATIENT)
Age: 55
End: 2020-06-22

## 2020-07-16 PROBLEM — F41.9 ANXIETY DISORDER, UNSPECIFIED: Chronic | Status: ACTIVE | Noted: 2019-12-24

## 2020-07-16 PROBLEM — L98.9 DISORDER OF THE SKIN AND SUBCUTANEOUS TISSUE, UNSPECIFIED: Chronic | Status: ACTIVE | Noted: 2020-01-06

## 2020-07-21 ENCOUNTER — APPOINTMENT (OUTPATIENT)
Dept: ORTHOPEDIC SURGERY | Facility: CLINIC | Age: 55
End: 2020-07-21
Payer: COMMERCIAL

## 2020-07-21 VITALS — HEIGHT: 71 IN | BODY MASS INDEX: 28 KG/M2 | WEIGHT: 200 LBS

## 2020-07-21 DIAGNOSIS — M17.12 UNILATERAL PRIMARY OSTEOARTHRITIS, LEFT KNEE: ICD-10-CM

## 2020-07-21 PROCEDURE — 73564 X-RAY EXAM KNEE 4 OR MORE: CPT | Mod: LT

## 2020-07-21 PROCEDURE — 73502 X-RAY EXAM HIP UNI 2-3 VIEWS: CPT | Mod: LT

## 2020-07-21 PROCEDURE — 99203 OFFICE O/P NEW LOW 30 MIN: CPT

## 2020-07-21 RX ORDER — GEL BASE NO.30
GEL (GRAM) MISCELLANEOUS
Refills: 0 | Status: ACTIVE | COMMUNITY

## 2020-07-21 RX ORDER — USTEKINUMAB 90 MG/ML
90 INJECTION, SOLUTION SUBCUTANEOUS
Refills: 0 | Status: ACTIVE | COMMUNITY

## 2020-07-21 RX ORDER — DUPILUMAB 200 MG/1.14ML
INJECTION, SOLUTION SUBCUTANEOUS
Refills: 0 | Status: ACTIVE | COMMUNITY

## 2020-07-21 NOTE — DISCUSSION/SUMMARY
[de-identified] : The underlying pathophysiology was reviewed in great detail with the patient as well as the various treatment options, including ice, analgesics, NSAIDs, Physical therapy, steroid injections\par \par Activity modifications and restrictions were discussed. I advised avoiding deep bending, squatting and high intensity activity.\par \par Discussed potential follow up with  rheumatologist for inflammatory workup due to history of psoriasis. \par \par A home exercise sheet was given and discussed with the patient to follow.  A Thera-Band was provided for exercise program. \par \par If pain persists we may order an MRI of the knee. \par \par FU 6 weeks or prn \par \par All questions were answered, all alternatives discussed and the patient is in complete agreement with that plan. Follow-up appointment as instructed. Any issues and the patient will call or come in sooner.\par

## 2020-07-21 NOTE — HISTORY OF PRESENT ILLNESS
[de-identified] : JENNIFER EDWARD is a 55 year male presenting to the office complaining of left knee pain. Patient reports pain began in June 2020. Denies injury or trauma to the area. He reports feeling the pain one day after walking a lot of stairs. The patient describes the pain as a dull aching, and occasionally sharp pain localized to the medial aspect of his left knee that is intermittent in nature. His  symptoms are exacerbated with stairs, and pivoting on her knee. Pain is alleviated with rest. Patient reports feeling "rice krispies" and grinding of the knee with stairs and extension of the knee.  He reports intermittent swelling of the knee as well. Patient is taking Ibuprofen for pain relief with mild relief in symptoms. 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  Patient also has a history of Psoriasis and is on Dupixent and Stellara for symptom control. Patient denies any other complaints at this time.

## 2020-07-21 NOTE — PHYSICAL EXAM
[de-identified] : Right Lower Extremity\par o Knee :\par ¦ Inspection/Palpation : no tenderness to palpation, no swelling, no deformity\par ¦ Range of Motion : 0 - 135 degrees, no crepitus\par ¦ Stability : no valgus or varus instability present on provocative testing, Lachman’s Test (-)\par ¦ Strength : flexion and extension 5/5, Adduction 5/5\par o Muscle Bulk : normal muscle bulk present\par o Skin : no erythema, no ecchymosis\par o Sensation : sensation to pin intact\par o Vascular Exam : no edema, no cyanosis, dorsalis pedis artery pulse 2+, posterior tibial artery pulse 2+\par \par Left Lower Extremity\par o Hip :\par ¦ Inspection/Palpation : no tenderness, no swelling, no deformities\par ¦ Range of Motion : full with mild pain in all planes, no crepitus\par ¦ Stability : joint stability intact\par ¦ Strength : hip flexion 5/5\par ¦ Tests and Signs : all tests for stability normal\par \par o Knee :\par ¦ Inspection/Palpation : medial joint line, and medial patellofemoral joint line tenderness to palpation, trace effusion, no deformity\par ¦ Range of Motion : 0 -135 degrees, no crepitus\par ¦ Stability : no valgus or varus instability present on provocative testing, Lachman’s Test (-)\par ¦ Strength : flexion and extension 4/5, Adduction 3/5, Abduction 5/5, Glute Med 4/5\par o Muscle Bulk : normal muscle bulk present\par o Skin : no erythema, no ecchymosis\par o Sensation : sensation to pin intact\par o Vascular Exam : no edema, no cyanosis, dorsalis pedis artery pulse 2+, posterior tibial artery pulse 2+ [de-identified] : o Left Knee : AP, lateral, sunrise, and Michel views of the knee were obtained, there are no soft tissue abnormalities, no fractures, alignment is normal, mild medial joint space narrowing, normal bone density, no bony lesions.\par \par o Left Hip and pelvis : AP and lateral views were obtained, there are no soft tissue abnormalities, no fractures, alignment is normal, normal bone density, normal appearing joint spaces,  Small osteophyte on the lateral femoral neck

## 2020-08-24 ENCOUNTER — APPOINTMENT (OUTPATIENT)
Dept: ORTHOPEDIC SURGERY | Facility: CLINIC | Age: 55
End: 2020-08-24

## 2020-09-12 ENCOUNTER — TRANSCRIPTION ENCOUNTER (OUTPATIENT)
Age: 55
End: 2020-09-12
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
## 2021-03-18 NOTE — ED PROVIDER NOTE - OBJECTIVE STATEMENT
pt 54 yo m c/o lower abd pain x 5days. pt ate a turkey burger and developed NBNB emesis, nausea, non bloody diarrhea, lower abd pain, fever tmax 101 3 days ago. now pt has only abd pain and has not urinated since this AM Opt out

## 2021-12-02 ENCOUNTER — EMERGENCY (EMERGENCY)
Facility: HOSPITAL | Age: 56
LOS: 1 days | Discharge: HOME | End: 2021-12-02
Attending: EMERGENCY MEDICINE | Admitting: EMERGENCY MEDICINE
Payer: COMMERCIAL

## 2021-12-02 VITALS
TEMPERATURE: 98 F | DIASTOLIC BLOOD PRESSURE: 87 MMHG | HEART RATE: 103 BPM | WEIGHT: 207.9 LBS | RESPIRATION RATE: 16 BRPM | HEIGHT: 69.5 IN | SYSTOLIC BLOOD PRESSURE: 131 MMHG | OXYGEN SATURATION: 95 %

## 2021-12-02 DIAGNOSIS — Z98.890 OTHER SPECIFIED POSTPROCEDURAL STATES: Chronic | ICD-10-CM

## 2021-12-02 LAB
ALBUMIN SERPL ELPH-MCNC: 3.8 G/DL — SIGNIFICANT CHANGE UP (ref 3.3–5)
ALP SERPL-CCNC: 86 U/L — SIGNIFICANT CHANGE UP (ref 40–120)
ALT FLD-CCNC: 24 U/L — SIGNIFICANT CHANGE UP (ref 10–45)
ANION GAP SERPL CALC-SCNC: 12 MMOL/L — SIGNIFICANT CHANGE UP (ref 5–17)
APPEARANCE UR: CLEAR — SIGNIFICANT CHANGE UP
AST SERPL-CCNC: 40 U/L — SIGNIFICANT CHANGE UP (ref 10–40)
BACTERIA # UR AUTO: ABNORMAL /HPF
BASOPHILS # BLD AUTO: 0.07 K/UL — SIGNIFICANT CHANGE UP (ref 0–0.2)
BASOPHILS NFR BLD AUTO: 0.5 % — SIGNIFICANT CHANGE UP (ref 0–2)
BILIRUB SERPL-MCNC: 0.7 MG/DL — SIGNIFICANT CHANGE UP (ref 0.2–1.2)
BILIRUB UR-MCNC: ABNORMAL
BUN SERPL-MCNC: 28 MG/DL — HIGH (ref 7–23)
CALCIUM SERPL-MCNC: 8.3 MG/DL — LOW (ref 8.4–10.5)
CHLORIDE SERPL-SCNC: 95 MMOL/L — LOW (ref 96–108)
CO2 SERPL-SCNC: 23 MMOL/L — SIGNIFICANT CHANGE UP (ref 22–31)
COLOR SPEC: YELLOW — SIGNIFICANT CHANGE UP
CREAT SERPL-MCNC: 1.51 MG/DL — HIGH (ref 0.5–1.3)
DIFF PNL FLD: ABNORMAL
EOSINOPHIL # BLD AUTO: 0.06 K/UL — SIGNIFICANT CHANGE UP (ref 0–0.5)
EOSINOPHIL NFR BLD AUTO: 0.4 % — SIGNIFICANT CHANGE UP (ref 0–6)
EPI CELLS # UR: SIGNIFICANT CHANGE UP
GLUCOSE SERPL-MCNC: 105 MG/DL — HIGH (ref 70–99)
GLUCOSE UR QL: NEGATIVE — SIGNIFICANT CHANGE UP
HCT VFR BLD CALC: 53.9 % — HIGH (ref 39–50)
HGB BLD-MCNC: 18.9 G/DL — HIGH (ref 13–17)
IMM GRANULOCYTES NFR BLD AUTO: 0.6 % — SIGNIFICANT CHANGE UP (ref 0–1.5)
KETONES UR-MCNC: ABNORMAL
LEUKOCYTE ESTERASE UR-ACNC: ABNORMAL
LYMPHOCYTES # BLD AUTO: 1.19 K/UL — SIGNIFICANT CHANGE UP (ref 1–3.3)
LYMPHOCYTES # BLD AUTO: 8.6 % — LOW (ref 13–44)
MCHC RBC-ENTMCNC: 31.9 PG — SIGNIFICANT CHANGE UP (ref 27–34)
MCHC RBC-ENTMCNC: 35.1 GM/DL — SIGNIFICANT CHANGE UP (ref 32–36)
MCV RBC AUTO: 91 FL — SIGNIFICANT CHANGE UP (ref 80–100)
MONOCYTES # BLD AUTO: 1.28 K/UL — HIGH (ref 0–0.9)
MONOCYTES NFR BLD AUTO: 9.2 % — SIGNIFICANT CHANGE UP (ref 2–14)
NEUTROPHILS # BLD AUTO: 11.18 K/UL — HIGH (ref 1.8–7.4)
NEUTROPHILS NFR BLD AUTO: 80.7 % — HIGH (ref 43–77)
NITRITE UR-MCNC: NEGATIVE — SIGNIFICANT CHANGE UP
NRBC # BLD: 0 /100 WBCS — SIGNIFICANT CHANGE UP (ref 0–0)
PH UR: 6 — SIGNIFICANT CHANGE UP (ref 5–8)
PLATELET # BLD AUTO: 192 K/UL — SIGNIFICANT CHANGE UP (ref 150–400)
POTASSIUM SERPL-MCNC: 3.9 MMOL/L — SIGNIFICANT CHANGE UP (ref 3.5–5.3)
POTASSIUM SERPL-SCNC: 3.9 MMOL/L — SIGNIFICANT CHANGE UP (ref 3.5–5.3)
PROT SERPL-MCNC: 7.3 G/DL — SIGNIFICANT CHANGE UP (ref 6–8.3)
PROT UR-MCNC: 30 MG/DL
RBC # BLD: 5.92 M/UL — HIGH (ref 4.2–5.8)
RBC # FLD: 13.8 % — SIGNIFICANT CHANGE UP (ref 10.3–14.5)
RBC CASTS # UR COMP ASSIST: SIGNIFICANT CHANGE UP /HPF (ref 0–4)
SODIUM SERPL-SCNC: 130 MMOL/L — LOW (ref 135–145)
SP GR SPEC: 1.02 — SIGNIFICANT CHANGE UP (ref 1.01–1.02)
UROBILINOGEN FLD QL: 1
WBC # BLD: 13.87 K/UL — HIGH (ref 3.8–10.5)
WBC # FLD AUTO: 13.87 K/UL — HIGH (ref 3.8–10.5)
WBC UR QL: SIGNIFICANT CHANGE UP /HPF (ref 0–5)

## 2021-12-02 PROCEDURE — 87086 URINE CULTURE/COLONY COUNT: CPT

## 2021-12-02 PROCEDURE — 81001 URINALYSIS AUTO W/SCOPE: CPT

## 2021-12-02 PROCEDURE — 85025 COMPLETE CBC W/AUTO DIFF WBC: CPT

## 2021-12-02 PROCEDURE — 74176 CT ABD & PELVIS W/O CONTRAST: CPT | Mod: MA

## 2021-12-02 PROCEDURE — 99284 EMERGENCY DEPT VISIT MOD MDM: CPT | Mod: 25

## 2021-12-02 PROCEDURE — 99285 EMERGENCY DEPT VISIT HI MDM: CPT

## 2021-12-02 PROCEDURE — 80053 COMPREHEN METABOLIC PANEL: CPT

## 2021-12-02 PROCEDURE — 36415 COLL VENOUS BLD VENIPUNCTURE: CPT

## 2021-12-02 PROCEDURE — 96361 HYDRATE IV INFUSION ADD-ON: CPT

## 2021-12-02 PROCEDURE — 71045 X-RAY EXAM CHEST 1 VIEW: CPT

## 2021-12-02 PROCEDURE — 96374 THER/PROPH/DIAG INJ IV PUSH: CPT

## 2021-12-02 PROCEDURE — 71045 X-RAY EXAM CHEST 1 VIEW: CPT | Mod: 26

## 2021-12-02 PROCEDURE — 74176 CT ABD & PELVIS W/O CONTRAST: CPT | Mod: 26,MA

## 2021-12-02 RX ORDER — SODIUM CHLORIDE 9 MG/ML
1000 INJECTION INTRAMUSCULAR; INTRAVENOUS; SUBCUTANEOUS ONCE
Refills: 0 | Status: COMPLETED | OUTPATIENT
Start: 2021-12-02 | End: 2021-12-02

## 2021-12-02 RX ORDER — KETOROLAC TROMETHAMINE 30 MG/ML
15 SYRINGE (ML) INJECTION ONCE
Refills: 0 | Status: DISCONTINUED | OUTPATIENT
Start: 2021-12-02 | End: 2021-12-02

## 2021-12-02 RX ADMIN — Medication 15 MILLIGRAM(S): at 12:29

## 2021-12-02 RX ADMIN — SODIUM CHLORIDE 1000 MILLILITER(S): 9 INJECTION INTRAMUSCULAR; INTRAVENOUS; SUBCUTANEOUS at 12:29

## 2021-12-02 RX ADMIN — SODIUM CHLORIDE 1000 MILLILITER(S): 9 INJECTION INTRAMUSCULAR; INTRAVENOUS; SUBCUTANEOUS at 11:24

## 2021-12-02 RX ADMIN — SODIUM CHLORIDE 1000 MILLILITER(S): 9 INJECTION INTRAMUSCULAR; INTRAVENOUS; SUBCUTANEOUS at 12:58

## 2021-12-02 RX ADMIN — Medication 15 MILLIGRAM(S): at 11:23

## 2021-12-02 RX ADMIN — SODIUM CHLORIDE 1000 MILLILITER(S): 9 INJECTION INTRAMUSCULAR; INTRAVENOUS; SUBCUTANEOUS at 12:28

## 2021-12-02 RX ADMIN — Medication 1 TABLET(S): at 12:28

## 2021-12-02 NOTE — ED PROVIDER NOTE - OBJECTIVE STATEMENT
55 y/o M with PMH of anxiety/depression, receives Testerone injections for low testerone levels, presents to the ED with c/o b/l lower back and flank pain a/w fever (tmax 102), x 3 days and +n/v/d, resolved yesterday.  Pt denies urinary sympts, abd pain, radiation of the back pain, CP, SOB, sick contacts or all other complaints. Pt had a rapid neg covid test 2 days ago.

## 2021-12-02 NOTE — ED ADULT NURSE NOTE - NSICDXPASTSURGICALHX_GEN_ALL_CORE_FT
PAST SURGICAL HISTORY:  H/O neck surgery cosmetic for loose skin    H/O oral surgery     H/O shoulder surgery right sgoulder - labrum surgery

## 2021-12-02 NOTE — ED PROVIDER NOTE - NS ED MD DISPO DISCHARGE
SUBJECTIVE:   Tiffany Barnett is a 89 y.o. female   Corrected distance visual acuity was 20/20 -2 in the right eye and CF@5ft in the left eye.   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 (History)        Assessment /Plan :  1. Post-operative state 3 months post phaco OS    2. Uveitis add Pred Acetate qd OS   3. Primary open angle glaucoma (POAG) of both eyes, moderate stage Doing well, IOP within acceptable range relative to target IOP and no evidence of progression. Continue current treatment. Reviewed importance of continued compliance with treatment and follow up.     4. Pseudophakia  -- Condition stable, no therapeutic change required. Monitoring routinely.       RTC in one month for Uveitis check and IOP check             Home

## 2021-12-02 NOTE — ED PROVIDER NOTE - ATTENDING CONTRIBUTION TO CARE
[place]obar with GEORGE Lopez. 57 y/o M with PMH of anxiety/depression, receives Testerone injections for low Testerone levels, presents to the ED with c/o b/l lower back and flank pain a/w fever (tmax 102), x 3 days and +n/v/d, resolved yesterday.  Pt denies urinary sympts, abd pain, radiation of the back pain, CP, SOB, sick contacts or all other complaints. Pt had a rapid neg covid test 2 days ago. PE as noted above. CXR negative. Abd CT results reviewed with patient-- diverticulitis. Will start abx and pt will f/u with is PCP. There is national shortage of cipro. Will give augmentin. No contraindications noted. Pt will f/u with PCP and will give copy of results such that creatinine and wbc can be trended outpt. Pt also advised for GI f/u in the future.    I performed a face to face bedside interview with patient regarding history of present illness, review of symptoms and past medical history. I completed an independent physical exam.  I have discussed the patient's plan of care with Physician Assistant (PA). I agree with note as stated above, having amended the EMR as needed to reflect my findings.   This includes History of Present Illness, HIV, Past Medical/Surgical/Family/Social History, Allergies and Home Medications, Review of Systems, Physical Exam, and any Progress Notes during the time I functioned as the attending physician for this patient.

## 2021-12-02 NOTE — ED PROVIDER NOTE - CLINICAL SUMMARY MEDICAL DECISION MAKING FREE TEXT BOX
55 y/o M with PMH of anxiety/depression, receives Testerone injections for low Testerone levels, presents to the ED with c/o b/l lower back and flank pain a/w fever (tmax 102), x 3 days and +n/v/d, resolved yesterday.  Pt denies urinary sympts, abd pain, radiation of the back pain, CP, SOB, sick contacts or all other complaints. Pt had a rapid neg covid test 2 days ago. PE as noted above. CXR negative. Abd CT results reviewed with patient-- diverticulitis. Will start abx and pt will f/u with is PCP
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
## 2021-12-02 NOTE — ED PROVIDER NOTE - PATIENT PORTAL LINK FT
You can access the FollowMyHealth Patient Portal offered by [facility] by registering at the following website: [email or web address]. By joining [facility]’s FollowMyHealth portal, you will also be able to view your health information using other applications (apps) compatible with our system.

## 2021-12-02 NOTE — ED PROVIDER NOTE - NSFOLLOWUPINSTRUCTIONS_ED_ALL_ED_FT
Follow up with your primary care physician within 2-3 days. Take the copy of your test results you were given in the emergency room for your doctor to review.     Take over the counter tylenol or motrin as needed for pain     Please fill the prescription for the antibiotics and take as directed.  Please finish the entire course of medication as prescribed.  If you have any belly pain after the antibiotics, yogurt has been shown to help with this.  Do not use any alcohol or grapefruit juice with any antibiotics.    Stay hydrated    Return to the ED for worsening pain, vomiting, fevers or any concerns  ****    Diverticulitis    Diverticulitis is inflammation or infection of small pouches in your colon that form when you HAVE a condition called diverticulosis. This condition can range from mild to severe potentially leading to perforation or obstructions of your colon. Symptoms include abdominal pain, fever/chills, nausea, vomiting, diarrhea, constipation, or blood in your stool. If you were prescribed an antibiotic medicine, take it as told by your health care provider. Do not stop taking the antibiotic even if you start to feel better.    SEEK IMMEDIATE MEDICAL CARE IF YOU HAVE ANY OF THE FOLLOWING SYMPTOMS: worsening abdominal pain, high fever, inability to hold down liquids or medication, black or bloody stools, inability to pass gas, lightheadedness/dizziness, or a change in mental status.

## 2021-12-03 LAB
CULTURE RESULTS: SIGNIFICANT CHANGE UP
SPECIMEN SOURCE: SIGNIFICANT CHANGE UP

## 2021-12-10 ENCOUNTER — APPOINTMENT (OUTPATIENT)
Dept: SURGERY | Facility: CLINIC | Age: 56
End: 2021-12-10
Payer: COMMERCIAL

## 2021-12-10 VITALS — BODY MASS INDEX: 29.12 KG/M2 | WEIGHT: 208 LBS | HEIGHT: 71 IN | TEMPERATURE: 97.1 F

## 2021-12-10 DIAGNOSIS — M54.9 DORSALGIA, UNSPECIFIED: ICD-10-CM

## 2021-12-10 PROCEDURE — 99203 OFFICE O/P NEW LOW 30 MIN: CPT

## 2021-12-11 PROBLEM — M54.9 BACK PAIN: Status: ACTIVE | Noted: 2021-12-11

## 2021-12-11 NOTE — PHYSICAL EXAM
[Normal Breath Sounds] : Normal breath sounds [Normal Heart Sounds] : normal heart sounds [Normal Rate and Rhythm] : normal rate and rhythm [Abdominal Masses] : No abdominal masses [Abdomen Tenderness] : ~T ~M Abdominal tenderness [No Rash or Lesion] : No rash or lesion [de-identified] : nl [de-identified] : nl [de-identified] : tender to light palpation in LLQ and suprapubic areas [de-identified] : nl

## 2021-12-11 NOTE — HISTORY OF PRESENT ILLNESS
[de-identified] : This 56 year old man developed low back pain, diarrhea, fever (102), nausea and vomiting ten days ago. He denied previous similar symptoms. Eventually, he presented to the ED where a CT scan revealed sigmoid diverticulitis. Presently, the back pain is improved, but still present, and lower abdominal pain has developed. The patient is still passing loose stool, but the nausea and vomiting has stopped.

## 2021-12-13 ENCOUNTER — APPOINTMENT (OUTPATIENT)
Dept: SURGERY | Facility: CLINIC | Age: 56
End: 2021-12-13
Payer: COMMERCIAL

## 2021-12-13 DIAGNOSIS — Z80.9 FAMILY HISTORY OF MALIGNANT NEOPLASM, UNSPECIFIED: ICD-10-CM

## 2021-12-13 DIAGNOSIS — Z82.49 FAMILY HISTORY OF ISCHEMIC HEART DISEASE AND OTHER DISEASES OF THE CIRCULATORY SYSTEM: ICD-10-CM

## 2021-12-13 DIAGNOSIS — F90.9 ATTENTION-DEFICIT HYPERACTIVITY DISORDER, UNSPECIFIED TYPE: ICD-10-CM

## 2021-12-13 PROCEDURE — 99214 OFFICE O/P EST MOD 30 MIN: CPT

## 2021-12-13 RX ORDER — USTEKINUMAB 45 MG/.5ML
45 INJECTION, SOLUTION SUBCUTANEOUS
Qty: 1 | Refills: 0 | Status: ACTIVE | COMMUNITY
Start: 2021-05-26

## 2021-12-13 RX ORDER — CYANOCOBALAMIN 1000 UG/ML
1000 INJECTION INTRAMUSCULAR; SUBCUTANEOUS
Qty: 1 | Refills: 0 | Status: ACTIVE | COMMUNITY
Start: 2021-10-11

## 2021-12-13 RX ORDER — CLONAZEPAM 1 MG/1
1 TABLET ORAL
Qty: 60 | Refills: 0 | Status: ACTIVE | COMMUNITY
Start: 2021-10-12

## 2021-12-13 RX ORDER — AMOXICILLIN AND CLAVULANATE POTASSIUM 875; 125 MG/1; MG/1
875-125 TABLET, COATED ORAL
Qty: 20 | Refills: 0 | Status: ACTIVE | COMMUNITY
Start: 2021-12-02

## 2021-12-13 NOTE — REVIEW OF SYSTEMS
[Heart Rate Is Slow] : the heart rate was not slow [Chest Pain] : no chest pain [Abdominal Pain] : abdominal pain [Diarrhea] : diarrhea [Negative] : Genitourinary

## 2021-12-13 NOTE — PHYSICAL EXAM
[Normal Breath Sounds] : Normal breath sounds [Normal Heart Sounds] : normal heart sounds [Normal Rate and Rhythm] : normal rate and rhythm [Abdominal Masses] : No abdominal masses [Abdomen Tenderness] : ~T ~M Abdominal tenderness [No Rash or Lesion] : No rash or lesion [de-identified] : nl [de-identified] : nl [de-identified] : stiil slight tenderness in LLQ [de-identified] : nl

## 2021-12-13 NOTE — HISTORY OF PRESENT ILLNESS
[de-identified] : The patient is feeling improved, as the pain is less as it "moved" from the back to the lower abdomen. The patient has been tolerating his low fiber diet. He denies fever or chills and he has been passing loose stool.

## 2021-12-13 NOTE — ASSESSMENT
[FreeTextEntry1] : Long discussion regarding all options and risks\par Finish antibiotics\par return in two weeks\par Probable repeat CT scan in near future\par Colonoscopy in two months\par All lab values and imaging studies reviewed\par Discussed with Medicine

## 2021-12-22 ENCOUNTER — APPOINTMENT (OUTPATIENT)
Dept: SURGERY | Facility: CLINIC | Age: 56
End: 2021-12-22
Payer: COMMERCIAL

## 2021-12-22 VITALS — HEIGHT: 71 IN | TEMPERATURE: 97.2 F | BODY MASS INDEX: 28.28 KG/M2 | WEIGHT: 202 LBS

## 2021-12-22 DIAGNOSIS — E27.8 OTHER SPECIFIED DISORDERS OF ADRENAL GLAND: ICD-10-CM

## 2021-12-22 DIAGNOSIS — E34.9 ENDOCRINE DISORDER, UNSPECIFIED: ICD-10-CM

## 2021-12-22 DIAGNOSIS — D48.5 NEOPLASM OF UNCERTAIN BEHAVIOR OF SKIN: ICD-10-CM

## 2021-12-22 DIAGNOSIS — R10.32 LEFT LOWER QUADRANT PAIN: ICD-10-CM

## 2021-12-22 DIAGNOSIS — K57.32 DIVERTICULITIS OF LARGE INTESTINE W/OUT PERFORATION OR ABSCESS W/OUT BLEEDING: ICD-10-CM

## 2021-12-22 PROCEDURE — 99214 OFFICE O/P EST MOD 30 MIN: CPT

## 2021-12-23 PROBLEM — E27.8 ADRENAL NODULE: Status: RESOLVED | Noted: 2018-01-10 | Resolved: 2021-12-23

## 2021-12-23 PROBLEM — E34.9 TESTOSTERONE DEFICIENCY: Status: ACTIVE | Noted: 2018-01-10

## 2021-12-23 PROBLEM — R10.32 ABDOMINAL PAIN, ACUTE, LEFT LOWER QUADRANT: Status: ACTIVE | Noted: 2021-12-11

## 2021-12-23 PROBLEM — K57.32 DIVERTICULITIS OF COLON: Status: ACTIVE | Noted: 2021-12-11

## 2021-12-23 PROBLEM — D48.5 NEOPLASM OF UNCERTAIN BEHAVIOR OF SKIN: Status: ACTIVE | Noted: 2018-01-18

## 2021-12-23 NOTE — HISTORY OF PRESENT ILLNESS
[de-identified] : The patient continues to feel well. He is eating and passing stool normally. His only problem is feeling unusually tired. The patient denies fever or chills and experiences only occasional lower abdominal pain.

## 2021-12-23 NOTE — REVIEW OF SYSTEMS
[Heart Rate Is Slow] : the heart rate was not slow [Chest Pain] : no chest pain [Negative] : Genitourinary

## 2021-12-23 NOTE — ASSESSMENT
[FreeTextEntry1] : Long discussion regarding all options and risks\par CT scan in one month\par Return in three weeks - before CT scan\par All lab values and imaging studies reviewed\par Discussed with Medicine\par Colonoscopy by Dr. Kennedy in two months

## 2021-12-23 NOTE — PHYSICAL EXAM
[Normal Breath Sounds] : Normal breath sounds [Normal Heart Sounds] : normal heart sounds [Normal Rate and Rhythm] : normal rate and rhythm [Abdominal Masses] : No abdominal masses [Abdomen Tenderness] : ~T ~M No abdominal tenderness [de-identified] : nl [de-identified] : nl [de-identified] : slight tenderness to deep palpation in suprapubic area

## 2022-01-12 ENCOUNTER — APPOINTMENT (OUTPATIENT)
Dept: SURGERY | Facility: CLINIC | Age: 57
End: 2022-01-12

## 2022-01-20 LAB — SARS-COV-2 N GENE NPH QL NAA+PROBE: DETECTED

## 2022-04-30 RX ORDER — IBUPROFEN 800 MG/1
800 TABLET, FILM COATED ORAL TWICE DAILY
Qty: 60 | Refills: 0 | Status: ACTIVE | COMMUNITY
Start: 2021-01-20 | End: 1900-01-01

## 2022-05-29 ENCOUNTER — RX RENEWAL (OUTPATIENT)
Age: 57
End: 2022-05-29

## 2022-06-16 NOTE — H&P PST ADULT - NS MD HP INPLANTS MED DEV
None Rhombic Flap Text: The defect edges were debeveled with a #15 scalpel blade.  Given the location of the defect and the proximity to free margins a rhombic flap was deemed most appropriate.  Using a sterile surgical marker, an appropriate rhombic flap was drawn incorporating the defect.    The area thus outlined was incised deep to adipose tissue with a #15 scalpel blade.  The skin margins were undermined to an appropriate distance in all directions utilizing iris scissors.

## 2022-10-20 RX ORDER — AMOXICILLIN AND CLAVULANATE POTASSIUM 875; 125 MG/1; MG/1
875-125 TABLET, COATED ORAL
Qty: 20 | Refills: 0 | Status: ACTIVE | COMMUNITY
Start: 2022-10-20 | End: 1900-01-01

## 2022-10-21 ENCOUNTER — APPOINTMENT (OUTPATIENT)
Dept: INTERNAL MEDICINE | Facility: CLINIC | Age: 57
End: 2022-10-21

## 2022-10-22 ENCOUNTER — EMERGENCY (EMERGENCY)
Facility: HOSPITAL | Age: 57
LOS: 1 days | Discharge: HOME | End: 2022-10-22
Attending: EMERGENCY MEDICINE | Admitting: EMERGENCY MEDICINE
Payer: COMMERCIAL

## 2022-10-22 VITALS
SYSTOLIC BLOOD PRESSURE: 154 MMHG | HEART RATE: 86 BPM | DIASTOLIC BLOOD PRESSURE: 72 MMHG | RESPIRATION RATE: 15 BRPM | OXYGEN SATURATION: 96 %

## 2022-10-22 VITALS
SYSTOLIC BLOOD PRESSURE: 170 MMHG | TEMPERATURE: 98 F | HEART RATE: 110 BPM | WEIGHT: 205.03 LBS | OXYGEN SATURATION: 95 % | RESPIRATION RATE: 18 BRPM | HEIGHT: 69.5 IN | DIASTOLIC BLOOD PRESSURE: 97 MMHG

## 2022-10-22 DIAGNOSIS — Z98.890 OTHER SPECIFIED POSTPROCEDURAL STATES: Chronic | ICD-10-CM

## 2022-10-22 LAB
ALBUMIN SERPL ELPH-MCNC: 3.8 G/DL — SIGNIFICANT CHANGE UP (ref 3.3–5)
ALP SERPL-CCNC: 97 U/L — SIGNIFICANT CHANGE UP (ref 40–120)
ALT FLD-CCNC: 28 U/L — SIGNIFICANT CHANGE UP (ref 10–45)
ANION GAP SERPL CALC-SCNC: 7 MMOL/L — SIGNIFICANT CHANGE UP (ref 5–17)
APPEARANCE UR: CLEAR — SIGNIFICANT CHANGE UP
AST SERPL-CCNC: 34 U/L — SIGNIFICANT CHANGE UP (ref 10–40)
BASOPHILS # BLD AUTO: 0.05 K/UL — SIGNIFICANT CHANGE UP (ref 0–0.2)
BASOPHILS NFR BLD AUTO: 0.5 % — SIGNIFICANT CHANGE UP (ref 0–2)
BILIRUB SERPL-MCNC: 0.7 MG/DL — SIGNIFICANT CHANGE UP (ref 0.2–1.2)
BILIRUB UR-MCNC: NEGATIVE — SIGNIFICANT CHANGE UP
BUN SERPL-MCNC: 14 MG/DL — SIGNIFICANT CHANGE UP (ref 7–23)
CALCIUM SERPL-MCNC: 8.7 MG/DL — SIGNIFICANT CHANGE UP (ref 8.4–10.5)
CHLORIDE SERPL-SCNC: 97 MMOL/L — SIGNIFICANT CHANGE UP (ref 96–108)
CO2 SERPL-SCNC: 28 MMOL/L — SIGNIFICANT CHANGE UP (ref 22–31)
COLOR SPEC: YELLOW — SIGNIFICANT CHANGE UP
CREAT SERPL-MCNC: 0.92 MG/DL — SIGNIFICANT CHANGE UP (ref 0.5–1.3)
DIFF PNL FLD: NEGATIVE — SIGNIFICANT CHANGE UP
EGFR: 96 ML/MIN/1.73M2 — SIGNIFICANT CHANGE UP
EOSINOPHIL # BLD AUTO: 0.07 K/UL — SIGNIFICANT CHANGE UP (ref 0–0.5)
EOSINOPHIL NFR BLD AUTO: 0.6 % — SIGNIFICANT CHANGE UP (ref 0–6)
GLUCOSE SERPL-MCNC: 91 MG/DL — SIGNIFICANT CHANGE UP (ref 70–99)
GLUCOSE UR QL: NEGATIVE — SIGNIFICANT CHANGE UP
HCT VFR BLD CALC: 46.1 % — SIGNIFICANT CHANGE UP (ref 39–50)
HGB BLD-MCNC: 15.9 G/DL — SIGNIFICANT CHANGE UP (ref 13–17)
IMM GRANULOCYTES NFR BLD AUTO: 0.3 % — SIGNIFICANT CHANGE UP (ref 0–0.9)
KETONES UR-MCNC: NEGATIVE — SIGNIFICANT CHANGE UP
LACTATE SERPL-SCNC: 0.6 MMOL/L — LOW (ref 0.7–2)
LEUKOCYTE ESTERASE UR-ACNC: NEGATIVE — SIGNIFICANT CHANGE UP
LIDOCAIN IGE QN: 27 U/L — LOW (ref 73–393)
LYMPHOCYTES # BLD AUTO: 1.8 K/UL — SIGNIFICANT CHANGE UP (ref 1–3.3)
LYMPHOCYTES # BLD AUTO: 16.2 % — SIGNIFICANT CHANGE UP (ref 13–44)
MCHC RBC-ENTMCNC: 32 PG — SIGNIFICANT CHANGE UP (ref 27–34)
MCHC RBC-ENTMCNC: 34.5 GM/DL — SIGNIFICANT CHANGE UP (ref 32–36)
MCV RBC AUTO: 92.8 FL — SIGNIFICANT CHANGE UP (ref 80–100)
MONOCYTES # BLD AUTO: 0.48 K/UL — SIGNIFICANT CHANGE UP (ref 0–0.9)
MONOCYTES NFR BLD AUTO: 4.3 % — SIGNIFICANT CHANGE UP (ref 2–14)
NEUTROPHILS # BLD AUTO: 8.66 K/UL — HIGH (ref 1.8–7.4)
NEUTROPHILS NFR BLD AUTO: 78.1 % — HIGH (ref 43–77)
NITRITE UR-MCNC: NEGATIVE — SIGNIFICANT CHANGE UP
NRBC # BLD: 0 /100 WBCS — SIGNIFICANT CHANGE UP (ref 0–0)
PH UR: 6 — SIGNIFICANT CHANGE UP (ref 5–8)
PLATELET # BLD AUTO: 235 K/UL — SIGNIFICANT CHANGE UP (ref 150–400)
POTASSIUM SERPL-MCNC: 4.6 MMOL/L — SIGNIFICANT CHANGE UP (ref 3.5–5.3)
POTASSIUM SERPL-SCNC: 4.6 MMOL/L — SIGNIFICANT CHANGE UP (ref 3.5–5.3)
PROT SERPL-MCNC: 7.2 G/DL — SIGNIFICANT CHANGE UP (ref 6–8.3)
PROT UR-MCNC: NEGATIVE — SIGNIFICANT CHANGE UP
RBC # BLD: 4.97 M/UL — SIGNIFICANT CHANGE UP (ref 4.2–5.8)
RBC # FLD: 13.2 % — SIGNIFICANT CHANGE UP (ref 10.3–14.5)
SODIUM SERPL-SCNC: 132 MMOL/L — LOW (ref 135–145)
SP GR SPEC: 1.01 — SIGNIFICANT CHANGE UP (ref 1.01–1.02)
UROBILINOGEN FLD QL: NEGATIVE — SIGNIFICANT CHANGE UP
WBC # BLD: 11.09 K/UL — HIGH (ref 3.8–10.5)
WBC # FLD AUTO: 11.09 K/UL — HIGH (ref 3.8–10.5)

## 2022-10-22 PROCEDURE — 36415 COLL VENOUS BLD VENIPUNCTURE: CPT

## 2022-10-22 PROCEDURE — 93005 ELECTROCARDIOGRAM TRACING: CPT

## 2022-10-22 PROCEDURE — 74177 CT ABD & PELVIS W/CONTRAST: CPT | Mod: MA

## 2022-10-22 PROCEDURE — 74177 CT ABD & PELVIS W/CONTRAST: CPT | Mod: 26,MA

## 2022-10-22 PROCEDURE — 87040 BLOOD CULTURE FOR BACTERIA: CPT

## 2022-10-22 PROCEDURE — 96375 TX/PRO/DX INJ NEW DRUG ADDON: CPT

## 2022-10-22 PROCEDURE — 99283 EMERGENCY DEPT VISIT LOW MDM: CPT

## 2022-10-22 PROCEDURE — 83605 ASSAY OF LACTIC ACID: CPT

## 2022-10-22 PROCEDURE — U0003: CPT

## 2022-10-22 PROCEDURE — 93010 ELECTROCARDIOGRAM REPORT: CPT

## 2022-10-22 PROCEDURE — 80053 COMPREHEN METABOLIC PANEL: CPT

## 2022-10-22 PROCEDURE — U0005: CPT

## 2022-10-22 PROCEDURE — 99285 EMERGENCY DEPT VISIT HI MDM: CPT

## 2022-10-22 PROCEDURE — 83690 ASSAY OF LIPASE: CPT

## 2022-10-22 PROCEDURE — 96374 THER/PROPH/DIAG INJ IV PUSH: CPT | Mod: XU

## 2022-10-22 PROCEDURE — 99285 EMERGENCY DEPT VISIT HI MDM: CPT | Mod: 25

## 2022-10-22 PROCEDURE — 96361 HYDRATE IV INFUSION ADD-ON: CPT

## 2022-10-22 PROCEDURE — 85025 COMPLETE CBC W/AUTO DIFF WBC: CPT

## 2022-10-22 RX ORDER — MORPHINE SULFATE 50 MG/1
4 CAPSULE, EXTENDED RELEASE ORAL ONCE
Refills: 0 | Status: DISCONTINUED | OUTPATIENT
Start: 2022-10-22 | End: 2022-10-22

## 2022-10-22 RX ORDER — KETOROLAC TROMETHAMINE 30 MG/ML
30 SYRINGE (ML) INJECTION ONCE
Refills: 0 | Status: DISCONTINUED | OUTPATIENT
Start: 2022-10-22 | End: 2022-10-22

## 2022-10-22 RX ORDER — SODIUM CHLORIDE 9 MG/ML
1000 INJECTION INTRAMUSCULAR; INTRAVENOUS; SUBCUTANEOUS ONCE
Refills: 0 | Status: COMPLETED | OUTPATIENT
Start: 2022-10-22 | End: 2022-10-22

## 2022-10-22 RX ORDER — ONDANSETRON 8 MG/1
4 TABLET, FILM COATED ORAL ONCE
Refills: 0 | Status: COMPLETED | OUTPATIENT
Start: 2022-10-22 | End: 2022-10-22

## 2022-10-22 RX ADMIN — MORPHINE SULFATE 4 MILLIGRAM(S): 50 CAPSULE, EXTENDED RELEASE ORAL at 10:56

## 2022-10-22 RX ADMIN — MORPHINE SULFATE 4 MILLIGRAM(S): 50 CAPSULE, EXTENDED RELEASE ORAL at 14:05

## 2022-10-22 RX ADMIN — ONDANSETRON 4 MILLIGRAM(S): 8 TABLET, FILM COATED ORAL at 10:56

## 2022-10-22 RX ADMIN — SODIUM CHLORIDE 1000 MILLILITER(S): 9 INJECTION INTRAMUSCULAR; INTRAVENOUS; SUBCUTANEOUS at 14:05

## 2022-10-22 RX ADMIN — Medication 30 MILLIGRAM(S): at 14:11

## 2022-10-22 RX ADMIN — SODIUM CHLORIDE 1000 MILLILITER(S): 9 INJECTION INTRAMUSCULAR; INTRAVENOUS; SUBCUTANEOUS at 10:59

## 2022-10-22 NOTE — ED PROVIDER NOTE - PATIENT PORTAL LINK FT
You can access the FollowMyHealth Patient Portal offered by [facility] by registering at the following website: [email or web address]. By joining [employer]’s FollowMyHealth portal, you will also be able to view your health information using other applications (apps) compatible with our system.

## 2022-10-22 NOTE — ED PROVIDER NOTE - PHYSICAL EXAMINATION
Gen:  Well appearing in NAD  Head:  NC/AT  HEENT: pupils perrl,no pharyngeal erythema, uvula midline  Cardiac: S1S2, RRR  Abd: Soft, ND, + bs, ttp llq, norebound, noguarding  Resp: No distress, CTA   musculoskeletal:: no deformities, no swelling, strength +5/+5  Skin: warm and dry as visualized, no rashes  Neuro: RIVREA, aao x 4  Psych:alert, cooperative, appropriate mood and affect for situation

## 2022-10-22 NOTE — ED ADULT NURSE NOTE - NSIMPLEMENTINTERV_GEN_ALL_ED
Implemented All Universal Safety Interventions:  [place] to call system. Call bell, personal items and telephone within reach. Instruct patient to call for assistance. Room bathroom lighting operational. Non-slip footwear when patient is off stretcher. Physically safe environment: no spills, clutter or unnecessary equipment. Stretcher in lowest position, wheels locked, appropriate side rails in place.

## 2022-10-22 NOTE — ED ADULT TRIAGE NOTE - CHIEF COMPLAINT QUOTE
Patient presents to ED from home complaining of abdominal pain and diarrhea. Patient states he had diverticulitis last year and the symptoms feel the same.

## 2022-10-22 NOTE — ED PROVIDER NOTE - CARE PROVIDER_API CALL
Kareem Cooper)  Gastroenterology; Internal Medicine  [address] [place] [address]  Phone: [phone]  Fax: [phone]  Follow Up Time:

## 2022-10-22 NOTE — ED PROVIDER NOTE - NSFOLLOWUPINSTRUCTIONS_ED_ALL_ED_FT
Diverticulitis       Diverticulitis is when small pouches in your colon (large intestine) get infected or swollen. This causes pain in the belly (abdomen) and watery poop (diarrhea).    These pouches are called diverticula. The pouches form in people who have a condition called diverticulosis.      What are the causes?    This condition may be caused by poop (stool) that gets trapped in the pouches in your colon. The poop lets germs (bacteria) grow in the pouches. This causes the infection.      What increases the risk?    You are more likely to get this condition if you have small pouches in your colon. The risk is higher if:  •You are overweight or very overweight (obese).      •You do not exercise enough.      •You drink alcohol.      •You smoke or use products with tobacco in them.      •You eat a diet that has a lot of red meat such as beef, pork, or lamb.      •You eat a diet that does not have enough fiber in it.      •You are older than 40 years of age.        What are the signs or symptoms?    •Pain in the belly. Pain is often on the left side, but it may be in other areas.      •Fever and feeling cold.      •Feeling like you may vomit.      •Vomiting.      •Having cramps.      •Feeling full.      •Changes to how often you poop.      •Blood in your poop.        How is this treated?    Most cases are treated at home by:  •Taking over-the-counter pain medicines.      •Following a clear liquid diet.      •Taking antibiotic medicines.      •Resting.      Very bad cases may need to be treated at a hospital. This may include:  •Not eating or drinking.      •Taking prescription pain medicine.      •Getting antibiotic medicines through an IV tube.      •Getting fluid and food through an IV tube.      •Having surgery.      When you are feeling better, your doctor may tell you to have a test to check your colon (colonoscopy).      Follow these instructions at home:    Medicines   •Take over-the-counter and prescription medicines only as told by your doctor. These include:  •Antibiotics.      •Pain medicines.      •Fiber pills.      •Probiotics.      •Stool softeners.        •If you were prescribed an antibiotic medicine, take it as told by your doctor. Do not stop taking the antibiotic even if you start to feel better.      •Ask your doctor if the medicine prescribed to you requires you to avoid driving or using machinery.        Eating and drinking      •Follow a diet as told by your doctor.    •When you feel better, your doctor may tell you to change your diet. You may need to eat a lot of fiber. Fiber makes it easier to poop (have a bowel movement). Foods with fiber include:  •Berries.      •Beans.      •Lentils.      •Green vegetables.        •Avoid eating red meat.      General instructions     • Do not use any products that contain nicotine or tobacco, such as cigarettes, e-cigarettes, and chewing tobacco. If you need help quitting, ask your doctor.      •Exercise 3 or more times a week. Try to get 30 minutes each time. Exercise enough to sweat and make your heart beat faster.      •Keep all follow-up visits as told by your doctor. This is important.        Contact a doctor if:    •Your pain does not get better.      •You are not pooping like normal.        Get help right away if:    •Your pain gets worse.      •Your symptoms do not get better.      •Your symptoms get worse very fast.      •You have a fever.      •You vomit more than one time.    •You have poop that is:  •Bloody.      •Black.      •Tarry.          Summary    •This condition happens when small pouches in your colon get infected or swollen.      •Take medicines only as told by your doctor.      •Follow a diet as told by your doctor.      •Keep all follow-up visits as told by your doctor. This is important.      This information is not intended to replace advice given to you by your health care provider. Make sure you discuss any questions you have with your health care provider.      Document Revised: 09/28/2020 Document Reviewed: 09/28/2020    Elsevier Patient Education © 2022 [employer]

## 2022-10-22 NOTE — ED ADULT NURSE NOTE - NS ED NURSE LEVEL OF CONSCIOUSNESS ORIENTATION
Oriented - self; Oriented - place; Oriented - time Mercedes Flap Text: The defect edges were debeveled with a #15 scalpel blade.  Given the location of the defect, shape of the defect and the proximity to free margins a Mercedes flap was deemed most appropriate.  Using a sterile surgical marker, an appropriate advancement flap was drawn incorporating the defect and placing the expected incisions within the relaxed skin tension lines where possible. The area thus outlined was incised deep to adipose tissue with a #15 scalpel blade.  The skin margins were undermined to an appropriate distance in all directions utilizing iris scissors.

## 2022-10-23 LAB — SARS-COV-2 RNA SPEC QL NAA+PROBE: SIGNIFICANT CHANGE UP

## 2022-10-27 LAB
CULTURE RESULTS: SIGNIFICANT CHANGE UP
SPECIMEN SOURCE: SIGNIFICANT CHANGE UP

## 2023-03-07 NOTE — PRE-ANESTHESIA EVALUATION ADULT - NSANTHRISKNONERD_GEN_ALL_CORE
Problem: At Risk for Injury Due to Fall  Goal: # Patient does not fall  Outcome: Outcome Met, Continue evaluating goal progress toward completion     Problem: Urinary Incontinence  Goal: # Fewer urinary incontinence episodes per day  Description: Select this goal if  goal is reduced incontinence (patient unable to control).  Outcome: Outcome Met, Continue evaluating goal progress toward completion     Problem: At Risk for Falls  Goal: # Verbalizes understanding of fall risk/precautions  Description: Document education using the patient education activity  Outcome: Outcome Not Met, Continue to Monitor      No risk alerts present

## 2023-11-02 NOTE — H&P PST ADULT - HISTORY OF PRESENT ILLNESS
55yo male current everyday smoker with medical h/o Anxiety and Depression and c/o cyst to left cheek x 6 months which he noticed have increased in size. Pt c/o old scar to left cheek. Pt presents today for PST for Excision Tumor Soft Tissue Face/Scalp scheduled for 12/31/2019 97

## 2023-11-05 ENCOUNTER — EMERGENCY (EMERGENCY)
Facility: HOSPITAL | Age: 58
LOS: 1 days | Discharge: LEFT BEFORE DISCHARGE | End: 2023-11-05
Attending: EMERGENCY MEDICINE | Admitting: EMERGENCY MEDICINE
Payer: COMMERCIAL

## 2023-11-05 ENCOUNTER — EMERGENCY (EMERGENCY)
Facility: HOSPITAL | Age: 58
LOS: 1 days | Discharge: ANOTHER HOSPITAL | End: 2023-11-05
Attending: EMERGENCY MEDICINE | Admitting: EMERGENCY MEDICINE
Payer: COMMERCIAL

## 2023-11-05 VITALS
SYSTOLIC BLOOD PRESSURE: 150 MMHG | TEMPERATURE: 98 F | HEART RATE: 92 BPM | RESPIRATION RATE: 17 BRPM | DIASTOLIC BLOOD PRESSURE: 93 MMHG | OXYGEN SATURATION: 96 %

## 2023-11-05 VITALS
TEMPERATURE: 98 F | RESPIRATION RATE: 16 BRPM | HEIGHT: 71 IN | SYSTOLIC BLOOD PRESSURE: 173 MMHG | WEIGHT: 210.1 LBS | OXYGEN SATURATION: 95 % | DIASTOLIC BLOOD PRESSURE: 88 MMHG | HEART RATE: 95 BPM

## 2023-11-05 VITALS
OXYGEN SATURATION: 97 % | TEMPERATURE: 98 F | RESPIRATION RATE: 18 BRPM | HEART RATE: 75 BPM | DIASTOLIC BLOOD PRESSURE: 92 MMHG | SYSTOLIC BLOOD PRESSURE: 147 MMHG

## 2023-11-05 VITALS
RESPIRATION RATE: 16 BRPM | HEART RATE: 91 BPM | SYSTOLIC BLOOD PRESSURE: 158 MMHG | OXYGEN SATURATION: 95 % | TEMPERATURE: 99 F | HEIGHT: 71 IN | DIASTOLIC BLOOD PRESSURE: 90 MMHG

## 2023-11-05 DIAGNOSIS — Z98.890 OTHER SPECIFIED POSTPROCEDURAL STATES: Chronic | ICD-10-CM

## 2023-11-05 LAB
ALBUMIN SERPL ELPH-MCNC: 4.1 G/DL — SIGNIFICANT CHANGE UP (ref 3.3–5)
ALBUMIN SERPL ELPH-MCNC: 4.1 G/DL — SIGNIFICANT CHANGE UP (ref 3.3–5)
ALP SERPL-CCNC: 104 U/L — SIGNIFICANT CHANGE UP (ref 40–120)
ALP SERPL-CCNC: 104 U/L — SIGNIFICANT CHANGE UP (ref 40–120)
ALT FLD-CCNC: 21 U/L — SIGNIFICANT CHANGE UP (ref 10–45)
ALT FLD-CCNC: 21 U/L — SIGNIFICANT CHANGE UP (ref 10–45)
ANION GAP SERPL CALC-SCNC: 6 MMOL/L — SIGNIFICANT CHANGE UP (ref 5–17)
ANION GAP SERPL CALC-SCNC: 6 MMOL/L — SIGNIFICANT CHANGE UP (ref 5–17)
APTT BLD: 32.9 SEC — SIGNIFICANT CHANGE UP (ref 24.5–35.6)
APTT BLD: 32.9 SEC — SIGNIFICANT CHANGE UP (ref 24.5–35.6)
AST SERPL-CCNC: 31 U/L — SIGNIFICANT CHANGE UP (ref 10–40)
AST SERPL-CCNC: 31 U/L — SIGNIFICANT CHANGE UP (ref 10–40)
BASOPHILS # BLD AUTO: 0.09 K/UL — SIGNIFICANT CHANGE UP (ref 0–0.2)
BASOPHILS # BLD AUTO: 0.09 K/UL — SIGNIFICANT CHANGE UP (ref 0–0.2)
BASOPHILS NFR BLD AUTO: 0.9 % — SIGNIFICANT CHANGE UP (ref 0–2)
BASOPHILS NFR BLD AUTO: 0.9 % — SIGNIFICANT CHANGE UP (ref 0–2)
BILIRUB SERPL-MCNC: 0.5 MG/DL — SIGNIFICANT CHANGE UP (ref 0.2–1.2)
BILIRUB SERPL-MCNC: 0.5 MG/DL — SIGNIFICANT CHANGE UP (ref 0.2–1.2)
BUN SERPL-MCNC: 38 MG/DL — HIGH (ref 7–23)
BUN SERPL-MCNC: 38 MG/DL — HIGH (ref 7–23)
CALCIUM SERPL-MCNC: 8.7 MG/DL — SIGNIFICANT CHANGE UP (ref 8.4–10.5)
CALCIUM SERPL-MCNC: 8.7 MG/DL — SIGNIFICANT CHANGE UP (ref 8.4–10.5)
CHLORIDE SERPL-SCNC: 100 MMOL/L — SIGNIFICANT CHANGE UP (ref 96–108)
CHLORIDE SERPL-SCNC: 100 MMOL/L — SIGNIFICANT CHANGE UP (ref 96–108)
CO2 SERPL-SCNC: 28 MMOL/L — SIGNIFICANT CHANGE UP (ref 22–31)
CO2 SERPL-SCNC: 28 MMOL/L — SIGNIFICANT CHANGE UP (ref 22–31)
CREAT SERPL-MCNC: 0.85 MG/DL — SIGNIFICANT CHANGE UP (ref 0.5–1.3)
CREAT SERPL-MCNC: 0.85 MG/DL — SIGNIFICANT CHANGE UP (ref 0.5–1.3)
EGFR: 99 ML/MIN/1.73M2 — SIGNIFICANT CHANGE UP
EGFR: 99 ML/MIN/1.73M2 — SIGNIFICANT CHANGE UP
EOSINOPHIL # BLD AUTO: 0.22 K/UL — SIGNIFICANT CHANGE UP (ref 0–0.5)
EOSINOPHIL # BLD AUTO: 0.22 K/UL — SIGNIFICANT CHANGE UP (ref 0–0.5)
EOSINOPHIL NFR BLD AUTO: 2.3 % — SIGNIFICANT CHANGE UP (ref 0–6)
EOSINOPHIL NFR BLD AUTO: 2.3 % — SIGNIFICANT CHANGE UP (ref 0–6)
GLUCOSE SERPL-MCNC: 93 MG/DL — SIGNIFICANT CHANGE UP (ref 70–99)
GLUCOSE SERPL-MCNC: 93 MG/DL — SIGNIFICANT CHANGE UP (ref 70–99)
HCT VFR BLD CALC: 46.1 % — SIGNIFICANT CHANGE UP (ref 39–50)
HCT VFR BLD CALC: 46.1 % — SIGNIFICANT CHANGE UP (ref 39–50)
HGB BLD-MCNC: 15.4 G/DL — SIGNIFICANT CHANGE UP (ref 13–17)
HGB BLD-MCNC: 15.4 G/DL — SIGNIFICANT CHANGE UP (ref 13–17)
IMM GRANULOCYTES NFR BLD AUTO: 0.4 % — SIGNIFICANT CHANGE UP (ref 0–0.9)
IMM GRANULOCYTES NFR BLD AUTO: 0.4 % — SIGNIFICANT CHANGE UP (ref 0–0.9)
INR BLD: 0.85 RATIO — SIGNIFICANT CHANGE UP (ref 0.85–1.18)
INR BLD: 0.85 RATIO — SIGNIFICANT CHANGE UP (ref 0.85–1.18)
LYMPHOCYTES # BLD AUTO: 2.69 K/UL — SIGNIFICANT CHANGE UP (ref 1–3.3)
LYMPHOCYTES # BLD AUTO: 2.69 K/UL — SIGNIFICANT CHANGE UP (ref 1–3.3)
LYMPHOCYTES # BLD AUTO: 28.1 % — SIGNIFICANT CHANGE UP (ref 13–44)
LYMPHOCYTES # BLD AUTO: 28.1 % — SIGNIFICANT CHANGE UP (ref 13–44)
MCHC RBC-ENTMCNC: 30.1 PG — SIGNIFICANT CHANGE UP (ref 27–34)
MCHC RBC-ENTMCNC: 30.1 PG — SIGNIFICANT CHANGE UP (ref 27–34)
MCHC RBC-ENTMCNC: 33.4 GM/DL — SIGNIFICANT CHANGE UP (ref 32–36)
MCHC RBC-ENTMCNC: 33.4 GM/DL — SIGNIFICANT CHANGE UP (ref 32–36)
MCV RBC AUTO: 90 FL — SIGNIFICANT CHANGE UP (ref 80–100)
MCV RBC AUTO: 90 FL — SIGNIFICANT CHANGE UP (ref 80–100)
MONOCYTES # BLD AUTO: 0.52 K/UL — SIGNIFICANT CHANGE UP (ref 0–0.9)
MONOCYTES # BLD AUTO: 0.52 K/UL — SIGNIFICANT CHANGE UP (ref 0–0.9)
MONOCYTES NFR BLD AUTO: 5.4 % — SIGNIFICANT CHANGE UP (ref 2–14)
MONOCYTES NFR BLD AUTO: 5.4 % — SIGNIFICANT CHANGE UP (ref 2–14)
NEUTROPHILS # BLD AUTO: 6.02 K/UL — SIGNIFICANT CHANGE UP (ref 1.8–7.4)
NEUTROPHILS # BLD AUTO: 6.02 K/UL — SIGNIFICANT CHANGE UP (ref 1.8–7.4)
NEUTROPHILS NFR BLD AUTO: 62.9 % — SIGNIFICANT CHANGE UP (ref 43–77)
NEUTROPHILS NFR BLD AUTO: 62.9 % — SIGNIFICANT CHANGE UP (ref 43–77)
NRBC # BLD: 0 /100 WBCS — SIGNIFICANT CHANGE UP (ref 0–0)
NRBC # BLD: 0 /100 WBCS — SIGNIFICANT CHANGE UP (ref 0–0)
PLATELET # BLD AUTO: 206 K/UL — SIGNIFICANT CHANGE UP (ref 150–400)
PLATELET # BLD AUTO: 206 K/UL — SIGNIFICANT CHANGE UP (ref 150–400)
POTASSIUM SERPL-MCNC: 4.5 MMOL/L — SIGNIFICANT CHANGE UP (ref 3.5–5.3)
POTASSIUM SERPL-MCNC: 4.5 MMOL/L — SIGNIFICANT CHANGE UP (ref 3.5–5.3)
POTASSIUM SERPL-SCNC: 4.5 MMOL/L — SIGNIFICANT CHANGE UP (ref 3.5–5.3)
POTASSIUM SERPL-SCNC: 4.5 MMOL/L — SIGNIFICANT CHANGE UP (ref 3.5–5.3)
PROT SERPL-MCNC: 7.3 G/DL — SIGNIFICANT CHANGE UP (ref 6–8.3)
PROT SERPL-MCNC: 7.3 G/DL — SIGNIFICANT CHANGE UP (ref 6–8.3)
PROTHROM AB SERPL-ACNC: 10 SEC — SIGNIFICANT CHANGE UP (ref 9.5–13)
PROTHROM AB SERPL-ACNC: 10 SEC — SIGNIFICANT CHANGE UP (ref 9.5–13)
RBC # BLD: 5.12 M/UL — SIGNIFICANT CHANGE UP (ref 4.2–5.8)
RBC # BLD: 5.12 M/UL — SIGNIFICANT CHANGE UP (ref 4.2–5.8)
RBC # FLD: 14.1 % — SIGNIFICANT CHANGE UP (ref 10.3–14.5)
RBC # FLD: 14.1 % — SIGNIFICANT CHANGE UP (ref 10.3–14.5)
SODIUM SERPL-SCNC: 134 MMOL/L — LOW (ref 135–145)
SODIUM SERPL-SCNC: 134 MMOL/L — LOW (ref 135–145)
WBC # BLD: 9.58 K/UL — SIGNIFICANT CHANGE UP (ref 3.8–10.5)
WBC # BLD: 9.58 K/UL — SIGNIFICANT CHANGE UP (ref 3.8–10.5)
WBC # FLD AUTO: 9.58 K/UL — SIGNIFICANT CHANGE UP (ref 3.8–10.5)
WBC # FLD AUTO: 9.58 K/UL — SIGNIFICANT CHANGE UP (ref 3.8–10.5)

## 2023-11-05 PROCEDURE — 85730 THROMBOPLASTIN TIME PARTIAL: CPT

## 2023-11-05 PROCEDURE — 80053 COMPREHEN METABOLIC PANEL: CPT

## 2023-11-05 PROCEDURE — 85025 COMPLETE CBC W/AUTO DIFF WBC: CPT

## 2023-11-05 PROCEDURE — 99285 EMERGENCY DEPT VISIT HI MDM: CPT | Mod: 25

## 2023-11-05 PROCEDURE — 99285 EMERGENCY DEPT VISIT HI MDM: CPT

## 2023-11-05 PROCEDURE — 93010 ELECTROCARDIOGRAM REPORT: CPT

## 2023-11-05 PROCEDURE — 99284 EMERGENCY DEPT VISIT MOD MDM: CPT

## 2023-11-05 PROCEDURE — 36415 COLL VENOUS BLD VENIPUNCTURE: CPT

## 2023-11-05 PROCEDURE — 93005 ELECTROCARDIOGRAM TRACING: CPT

## 2023-11-05 PROCEDURE — 85610 PROTHROMBIN TIME: CPT

## 2023-11-05 NOTE — ED PROVIDER NOTE - NS ED ROS FT
CONSTITUTIONAL: No fevers, no chills, no lightheadedness, no dizziness  EYES: + visual changes, no eye pain  EARS: no ear drainage, no ear pain, no change in hearing  NOSE: no nasal congestion  MOUTH/THROAT: no sore throat  CV: No chest pain, no palpitations  RESP: No SOB, no cough  GI: No n/v/d, no abd pain  : no dysuria, no hematuria, no flank pain  MSK: no back pain, no extremity pain  SKIN: no rashes  NEURO: no headache, no focal weakness, no decreased sensation/parasthesias

## 2023-11-05 NOTE — ED PROVIDER NOTE - CLINICAL SUMMARY MEDICAL DECISION MAKING FREE TEXT BOX
58 year old male with hx of anxiety/depression presents with left eye visual changes x 2 days. Sent here from [place] for ophthalmology evaluation. No neuro deficits on exam. No prior TIA/CVAs. Will consult ophthalmology, if the exam is negative, will consider additional neuro evaluation.

## 2023-11-05 NOTE — ED PROVIDER NOTE - OBJECTIVE STATEMENT
58 year old male with hx of anxiety/depression presents to the ED complaining of left eye vision changes x 2 days. Patient was transferred here from [facility] for ophthalmology evaluation. He describes his symptoms as shadowing of his left eye visual field 2-7o'clock, with diplopia. He also endorses feeling off balance intermittently secondary to the diplopia. Denies eye pain, headache, dizziness, numbness/tingling, fever, nausea, vomiting. He wears glasses, denies contact use. No eye trauma or head injuries.

## 2023-11-05 NOTE — ED PROVIDER NOTE - OBJECTIVE STATEMENT
57 yo male, wears progressive glasses for vison, no contact lens use, complaining of worsening vision in his left eye: patient reports he saw "a lime green line" travel across his vision from top to bottom on Friday 11/3/23 that occurred twice while watching TV, with some decreased vision, reports throughout Saturday and today Sunday 11/5/23 worsening double vision and decreased visual field only in the left eye. Patient Denies any pain, discharge, denies any trauma, denies any fevers, chills, headache, hearing or visual changes, chest pain, shortness of breath, weakness, tingling, chest pain, any other injury or complaint.  Patient does report he has not seen an ophthalmologist in several years.

## 2023-11-05 NOTE — ED PROVIDER NOTE - NSRISKOFTRANSFER_ED_A_ED
[>50% of Time Spent on Counseling and Coordination of Care for  ___] : Greater than 50% of the encounter time was spent on counseling and coordination of care for [unfilled]
Deterioration of Condition En Route/Death or Disability/Transportation Risk (There is always a risk of traffic delays resulting in deterioration of condition.)

## 2023-11-05 NOTE — ED ADULT NURSE NOTE - NSFALLUNIVINTERV_ED_ALL_ED
Bed/Stretcher in lowest position, wheels locked, appropriate side rails in place/Call bell, personal items and telephone in reach/Instruct patient to call for assistance before getting out of bed/chair/stretcher/Non-slip footwear applied when patient is off stretcher/[place] to call system/Physically safe environment - no spills, clutter or unnecessary equipment/Purposeful proactive rounding/Room/bathroom lighting operational, light cord in reach

## 2023-11-05 NOTE — ED PROVIDER NOTE - PHYSICAL EXAMINATION
GEN: NAD, awake, eyes open spontaneously  EYES: normal conjunctiva, perrl, eomi without pain, visual acuity 20/30 bilaterally. left inferior lateral visual field deficit.   ENT: NCAT, MMM, Trachea midline  NECK: No cervical spine tenderness, full ROM.   CHEST/LUNGS: Non-tachypneic, CTAB, bilateral breath sounds  CARDIAC: Non-tachycardic, normal perfusion  ABDOMEN: Soft, NTND, No rebound/guarding  MSK: No edema, no gross deformity of extremities  SKIN: No rashes, no petechiae, no vesicles  NEURO: CN grossly intact, normal coordination, no focal motor or sensory deficits  PSYCH: Alert, appropriate, cooperative, with capacity and insight GEN: NAD, awake, eyes open spontaneously  EYES: normal conjunctiva, perrl, eomi without pain, visual acuity 20/30 bilaterally.   ENT: NCAT, MMM, Trachea midline  NECK: No cervical spine tenderness, full ROM.   CHEST/LUNGS: Non-tachypneic, CTAB, bilateral breath sounds  CARDIAC: Non-tachycardic, normal perfusion  ABDOMEN: Soft, NTND, No rebound/guarding  MSK: No edema, no gross deformity of extremities  SKIN: No rashes, no petechiae, no vesicles  NEURO: CN grossly intact, normal coordination, no focal motor or sensory deficits  PSYCH: Alert, appropriate, cooperative, with capacity and insight

## 2023-11-05 NOTE — ED ADULT NURSE NOTE - OBJECTIVE STATEMENT
Luz RN: 59 y/o male, a&ox4, ambulatory, transferred from  for ophthalmology consult. Pt states since Friday he has experiences double vision and shaded area in the left eye. Unknown etiology, denying any trauma or injury. No signs of redness, swelling, or active bleeding noted to the eye region. Past medical history of severe anxiety and depression, endorses compliance with home meds. Pt arrives with 20GIV in right forearm. Airway is patent, speaking in clear and coherent sentences. Respirations are even and unlabored, no signs of respiratory distress.

## 2023-11-05 NOTE — ED PROVIDER NOTE - ATTENDING APP SHARED VISIT CONTRIBUTION OF CARE
Eval with GEORGE Winter. 57 yo male, wears progressive glasses for vison, no contact lens use, complaining of worsening vision in his left eye: patient reports he saw "a lime green line" travel across his vision from top to bottom on Friday 11/3/23 that occurred twice while watching TV, with some decreased vision, reports throughout Saturday and today Sunday 11/5/23 worsening double vision and decreased visual field only in the left eye. Patient Denies any pain, discharge, denies any trauma, denies any fevers, chills, headache, hearing or visual changes, chest pain, shortness of breath, weakness, tingling, chest pain, any other injury or complaint.  Patient does report he has not seen an ophthalmologist in several years.     Exam as stated.     GEORGE Winter Spoke with Dr. Rodríguez, Ophthalmologist at [facility], who recommended transfer to [facility], Dr. Gaspar is Accepting, as per transfer center auto-accepted to ED.  Patient aware and agreed with transfer.    I performed a face to face bedside interview with patient regarding history of present illness, review of symptoms and past medical history. I completed an independent physical exam.  I have discussed the patient's plan of care with PA/NP/Resident. I agree with note as stated above, having amended the EMR as needed to reflect my findings.   This includes History of Present Illness, HIV, Past Medical/Surgical/Family/Social History, Allergies and Home Medications, Review of Systems, Physical Exam, and any Progress Notes during the time I functioned as the attending physician for this patient.

## 2023-11-05 NOTE — ED PROVIDER NOTE - PHYSICAL EXAMINATION
On physical examination patient with no av-nicking, no obvious hemorrhages or papilledema on fundoscopic exam, PERRLA, EOMI, No nystagmus, IOP: 10 OS, 12 OD, VA corrected: 20/40 OS, 20/30 OD, 20/40 OU. decreased visual fields only in the left eye, inferior-lateral quadrant of field. Left eye with no av-nicking, red reflex present and equal b/l, no obvious hemorrhages or papilledema on fundoscopic exam, PERRLA, EOMI, No nystagmus, IOP: 10 OS, 12 OD, VA corrected: 20/40 OS, 20/30 OD, 20/40 OU. decreased visual fields only in the left eye, inferior-lateral quadrants of field.

## 2023-11-05 NOTE — ED PROVIDER NOTE - PROGRESS NOTE DETAILS
Maryellen Neely DO (PGY-1): Able to contact patient over the phone. I told him kathleen is here to evaluate him, however he left and does not want to come back to the ED. Informed him of risks and benefits of examination and treatment. Patient states he will call office in the morning and schedule outpatient appointment. Provided him with phone number for the clinic and strict return precautions. Maryellen Neely DO (PGY-1): Patient is not in the room. Called over head.

## 2023-11-05 NOTE — ED ADULT NURSE NOTE - OBJECTIVE STATEMENT
Assumed pt care for a 58 yr old male complaining of left eye visual disturbance. Pt reports on Friday night while watching TV he noted green color to his left eye vision, this change in color happened twice. Since then he went to sleep and on Saturday morning he noted he was not able to see from the right bottom side of left eye. Pt reports the area is grey and is unable to see anything. Pt also reports double vision that has worsen since then. MD aware. Pt denies any further complaints. Awaiting further disposition.

## 2023-11-05 NOTE — ED ADULT NURSE NOTE - NS ED NURSE ELOPE COMMENTS
opthalmology md at bedside but u/a to locate pt.  pt contacted via cell.  stated he didn't want to wait any longer. spoken to by dr. griggs regarding risks of leaning and not returning. and follow up opthalmology md at bedside but u/a to locate pt.  pt contacted via cell.  stated he didn't want to wait any longer. spoken to by dr. griggs regarding risks of leaning and not returning. and follow up. pt reports he removed h/l and placed in sharps popthalmology md at bedside but u/a to locate pt.  pt contacted via cell.  stated he didn't want to wait any longer. spoken to by dr. griggs regarding risks of leaning and not returning. and follow up. pt reports he removed h/l and placed in sharps

## 2023-11-05 NOTE — ED PROVIDER NOTE - CLINICAL SUMMARY MEDICAL DECISION MAKING FREE TEXT BOX
57 yo male, wears progressive glasses for vison, no contact lens use, complaining of worsening vision in his left eye: patient reports he saw "a lime green line" travel across his vision from top to bottom on Friday 11/3/23 that occurred twice while watching TV, with some decreased vision, reports throughout Saturday and today Sunday 11/5/23 worsening double vision and decreased visual field only in the left eye. Patient Denies any pain, discharge, denies any trauma, denies any fevers, chills, headache, hearing or visual changes, chest pain, shortness of breath, weakness, tingling, chest pain, any other injury or complaint.  Patient does report he has not seen an ophthalmologist in several years.     On physical examination patient with no obvious hemorrhages or papilledema on fundoscopic exam, PERRLA, EOMI, No nystagmus, IOP: 10 OS, 12 OD, VA corrected: 20/40 OS, 20/30 OD, 20/40 OU. decreased visual fields only in the left eye, inferior-lateral quadrant of field.    Spoke with Dr. Rodríguez, Ophthalmologist at [facility], who recommended transfer to [facility], Dr. Gaspar is Accepting, as per transfer center auto-accepted to ED.  Patient aware and agreed with transfer.

## 2023-11-05 NOTE — ED ADULT NURSE NOTE - CHIEF COMPLAINT QUOTE
Patient is transferred from [facility] by [facility](unit # [id number] [facility] [id number]) for seeing a shaded area of left eye since friday. Here to see opthomology. # 20 S/L noted in right hand. History of anxiety. NAD noted.

## 2023-11-05 NOTE — ED PROVIDER NOTE - NSICDXPASTMEDICALHX_GEN_ALL_CORE_FT
PAST MEDICAL HISTORY:  Anxiety and depression     Eczematous skin lesions neck area    
Universal Safety Interventions

## 2023-11-05 NOTE — ED ADULT TRIAGE NOTE - CHIEF COMPLAINT QUOTE
Patient is transferred from [facility] by [facility](unit # 6 Medical 124) for seeing a shaded area of left eye since friday. Here to see opthomology. # 20 S/L noted in right hand. History of anxiety. NAD noted.

## 2023-11-05 NOTE — ED PROVIDER NOTE - CHPI ED SYMPTOMS NEG
no discharge/no drainage/no itching/no pain/no purulent drainage/no eye lid swelling/no foreign body

## 2023-11-05 NOTE — ED PROVIDER NOTE - ATTENDING CONTRIBUTION TO CARE
Patient with history of anxiety and depression presents emergency department as transfer from [facility] for ophthalmology evaluation.Patient reports for the last 2 days he has had diplopia when looking down into the left as well as darkening of his visual fields in that area as well.  Denies dizziness, lightheadedness, headache, blurry vision, nausea, vomiting, numbness, weakness, tingling to extremities, difficulties with speech.  On my evaluation of the patient he has no neurological deficits his extraocular movements are intact and he is normal cranial nerves strength and sensation throughout.  Ophthalmology paged, awaiting evaluation. Patient with history of anxiety and depression presents emergency department as transfer from [facility] for ophthalmology evaluation.Patient reports for the last 2 days he has had diplopia when looking down into the left as well as darkening of his visual fields in that area as well.  Denies dizziness, lightheadedness, headache, blurry vision, nausea, vomiting, numbness, weakness, tingling to extremities, difficulties with speech.  On my evaluation of the patient he has no neurological deficits his extraocular movements are intact and he is normal cranial nerves strength and sensation throughout.  There is no concern at this time for CVA given normal neurological exam with EOMI, no stroke risk factors. Labs reviewed from [facility] WNL. Ophthalmology paged, awaiting evaluation. Pt signed out to oncoming attending Dr. Barnett.

## 2023-11-05 NOTE — ED PROVIDER NOTE - NS ED ATTENDING STATEMENT MOD
This was a shared visit with the KATRIN. I reviewed and verified the documentation and independently performed the documented:

## 2024-03-16 ENCOUNTER — NON-APPOINTMENT (OUTPATIENT)
Age: 59
End: 2024-03-16

## 2024-09-11 ENCOUNTER — INPATIENT (INPATIENT)
Facility: HOSPITAL | Age: 59
LOS: 1 days | Discharge: HOME | DRG: 66 | End: 2024-09-13
Attending: INTERNAL MEDICINE | Admitting: STUDENT IN AN ORGANIZED HEALTH CARE EDUCATION/TRAINING PROGRAM
Payer: COMMERCIAL

## 2024-09-11 VITALS
WEIGHT: 214.73 LBS | OXYGEN SATURATION: 96 % | RESPIRATION RATE: 18 BRPM | SYSTOLIC BLOOD PRESSURE: 151 MMHG | TEMPERATURE: 98 F | DIASTOLIC BLOOD PRESSURE: 90 MMHG | HEART RATE: 104 BPM

## 2024-09-11 DIAGNOSIS — I63.9 CEREBRAL INFARCTION, UNSPECIFIED: ICD-10-CM

## 2024-09-11 DIAGNOSIS — Z98.890 OTHER SPECIFIED POSTPROCEDURAL STATES: Chronic | ICD-10-CM

## 2024-09-11 LAB
ALBUMIN SERPL ELPH-MCNC: 3.9 G/DL — SIGNIFICANT CHANGE UP (ref 3.3–5)
ALP SERPL-CCNC: 97 U/L — SIGNIFICANT CHANGE UP (ref 40–120)
ALT FLD-CCNC: 25 U/L — SIGNIFICANT CHANGE UP (ref 10–45)
ANION GAP SERPL CALC-SCNC: 6 MMOL/L — SIGNIFICANT CHANGE UP (ref 5–17)
APTT BLD: 38.1 SEC — HIGH (ref 24.5–35.6)
AST SERPL-CCNC: 26 U/L — SIGNIFICANT CHANGE UP (ref 10–40)
BASOPHILS # BLD AUTO: 0.04 K/UL — SIGNIFICANT CHANGE UP (ref 0–0.2)
BASOPHILS NFR BLD AUTO: 0.4 % — SIGNIFICANT CHANGE UP (ref 0–2)
BILIRUB SERPL-MCNC: 0.6 MG/DL — SIGNIFICANT CHANGE UP (ref 0.2–1.2)
BUN SERPL-MCNC: 19 MG/DL — SIGNIFICANT CHANGE UP (ref 7–23)
CALCIUM SERPL-MCNC: 8.8 MG/DL — SIGNIFICANT CHANGE UP (ref 8.4–10.5)
CHLORIDE SERPL-SCNC: 101 MMOL/L — SIGNIFICANT CHANGE UP (ref 96–108)
CO2 SERPL-SCNC: 31 MMOL/L — SIGNIFICANT CHANGE UP (ref 22–31)
CREAT SERPL-MCNC: 1.18 MG/DL — SIGNIFICANT CHANGE UP (ref 0.5–1.3)
EGFR: 70 ML/MIN/1.73M2 — SIGNIFICANT CHANGE UP
EOSINOPHIL # BLD AUTO: 0.06 K/UL — SIGNIFICANT CHANGE UP (ref 0–0.5)
EOSINOPHIL NFR BLD AUTO: 0.5 % — SIGNIFICANT CHANGE UP (ref 0–6)
ETHANOL SERPL-MCNC: <3 MG/DL — SIGNIFICANT CHANGE UP (ref 0–3)
GLUCOSE SERPL-MCNC: 90 MG/DL — SIGNIFICANT CHANGE UP (ref 70–99)
HCT VFR BLD CALC: 47.1 % — SIGNIFICANT CHANGE UP (ref 39–50)
HGB BLD-MCNC: 16.2 G/DL — SIGNIFICANT CHANGE UP (ref 13–17)
IMM GRANULOCYTES NFR BLD AUTO: 0.4 % — SIGNIFICANT CHANGE UP (ref 0–0.9)
INR BLD: 0.95 RATIO — SIGNIFICANT CHANGE UP (ref 0.85–1.18)
LYMPHOCYTES # BLD AUTO: 39.2 % — SIGNIFICANT CHANGE UP (ref 13–44)
LYMPHOCYTES # BLD AUTO: 4.43 K/UL — HIGH (ref 1–3.3)
MCHC RBC-ENTMCNC: 31.6 PG — SIGNIFICANT CHANGE UP (ref 27–34)
MCHC RBC-ENTMCNC: 34.4 GM/DL — SIGNIFICANT CHANGE UP (ref 32–36)
MCV RBC AUTO: 92 FL — SIGNIFICANT CHANGE UP (ref 80–100)
MONOCYTES # BLD AUTO: 0.63 K/UL — SIGNIFICANT CHANGE UP (ref 0–0.9)
MONOCYTES NFR BLD AUTO: 5.6 % — SIGNIFICANT CHANGE UP (ref 2–14)
NEUTROPHILS # BLD AUTO: 6.09 K/UL — SIGNIFICANT CHANGE UP (ref 1.8–7.4)
NEUTROPHILS NFR BLD AUTO: 53.9 % — SIGNIFICANT CHANGE UP (ref 43–77)
NRBC # BLD: 0 /100 WBCS — SIGNIFICANT CHANGE UP (ref 0–0)
PLATELET # BLD AUTO: 221 K/UL — SIGNIFICANT CHANGE UP (ref 150–400)
POTASSIUM SERPL-MCNC: 4.2 MMOL/L — SIGNIFICANT CHANGE UP (ref 3.5–5.3)
POTASSIUM SERPL-SCNC: 4.2 MMOL/L — SIGNIFICANT CHANGE UP (ref 3.5–5.3)
PROT SERPL-MCNC: 6.8 G/DL — SIGNIFICANT CHANGE UP (ref 6–8.3)
PROTHROM AB SERPL-ACNC: 10.8 SEC — SIGNIFICANT CHANGE UP (ref 9.5–13)
RBC # BLD: 5.12 M/UL — SIGNIFICANT CHANGE UP (ref 4.2–5.8)
RBC # FLD: 13.8 % — SIGNIFICANT CHANGE UP (ref 10.3–14.5)
SODIUM SERPL-SCNC: 138 MMOL/L — SIGNIFICANT CHANGE UP (ref 135–145)
TROPONIN I, HIGH SENSITIVITY RESULT: 4.3 NG/L — SIGNIFICANT CHANGE UP
WBC # BLD: 11.29 K/UL — HIGH (ref 3.8–10.5)
WBC # FLD AUTO: 11.29 K/UL — HIGH (ref 3.8–10.5)

## 2024-09-11 PROCEDURE — 93010 ELECTROCARDIOGRAM REPORT: CPT

## 2024-09-11 PROCEDURE — 70496 CT ANGIOGRAPHY HEAD: CPT | Mod: 26,MC

## 2024-09-11 PROCEDURE — 99223 1ST HOSP IP/OBS HIGH 75: CPT

## 2024-09-11 PROCEDURE — 71045 X-RAY EXAM CHEST 1 VIEW: CPT | Mod: 26

## 2024-09-11 PROCEDURE — 0042T: CPT | Mod: MC

## 2024-09-11 PROCEDURE — 99285 EMERGENCY DEPT VISIT HI MDM: CPT

## 2024-09-11 PROCEDURE — 70498 CT ANGIOGRAPHY NECK: CPT | Mod: 26,MC

## 2024-09-11 PROCEDURE — 99284 EMERGENCY DEPT VISIT MOD MDM: CPT

## 2024-09-11 RX ORDER — CLONAZEPAM 1 MG
1 TABLET ORAL
Refills: 0 | DISCHARGE

## 2024-09-11 RX ORDER — PHENELZINE SULFATE 15 MG/1
1 TABLET, FILM COATED ORAL
Refills: 0 | DISCHARGE

## 2024-09-11 RX ORDER — METHYLPHENIDATE HYDROCHLORIDE 72 MG/1
1 TABLET, EXTENDED RELEASE ORAL
Refills: 0 | DISCHARGE

## 2024-09-11 RX ORDER — ENOXAPARIN SODIUM 100 MG/ML
40 INJECTION SUBCUTANEOUS EVERY 24 HOURS
Refills: 0 | Status: DISCONTINUED | OUTPATIENT
Start: 2024-09-11 | End: 2024-09-13

## 2024-09-11 RX ORDER — ONDANSETRON 2 MG/ML
4 INJECTION, SOLUTION INTRAMUSCULAR; INTRAVENOUS EVERY 8 HOURS
Refills: 0 | Status: DISCONTINUED | OUTPATIENT
Start: 2024-09-11 | End: 2024-09-13

## 2024-09-11 RX ORDER — METHYLPHENIDATE HYDROCHLORIDE 72 MG/1
10 TABLET, EXTENDED RELEASE ORAL
Refills: 0 | Status: DISCONTINUED | OUTPATIENT
Start: 2024-09-11 | End: 2024-09-13

## 2024-09-11 RX ORDER — ASPIRIN 81 MG
81 TABLET, DELAYED RELEASE (ENTERIC COATED) ORAL DAILY
Refills: 0 | Status: DISCONTINUED | OUTPATIENT
Start: 2024-09-12 | End: 2024-09-13

## 2024-09-11 RX ORDER — PHENELZINE SULFATE 15 MG/1
15 TABLET, FILM COATED ORAL
Refills: 0 | Status: DISCONTINUED | OUTPATIENT
Start: 2024-09-11 | End: 2024-09-13

## 2024-09-11 RX ORDER — ACETAMINOPHEN 325 MG/1
650 TABLET ORAL EVERY 6 HOURS
Refills: 0 | Status: DISCONTINUED | OUTPATIENT
Start: 2024-09-11 | End: 2024-09-13

## 2024-09-11 RX ORDER — CLONAZEPAM 1 MG
1 TABLET ORAL
Refills: 0 | Status: DISCONTINUED | OUTPATIENT
Start: 2024-09-11 | End: 2024-09-13

## 2024-09-11 RX ORDER — MAGNESIUM, ALUMINUM HYDROXIDE 200-225/5
30 SUSPENSION, ORAL (FINAL DOSE FORM) ORAL EVERY 4 HOURS
Refills: 0 | Status: DISCONTINUED | OUTPATIENT
Start: 2024-09-11 | End: 2024-09-13

## 2024-09-11 RX ADMIN — ENOXAPARIN SODIUM 40 MILLIGRAM(S): 100 INJECTION SUBCUTANEOUS at 18:11

## 2024-09-11 RX ADMIN — Medication 80 MILLIGRAM(S): at 22:50

## 2024-09-11 RX ADMIN — Medication 300 MILLIGRAM(S): at 18:11

## 2024-09-11 NOTE — ED ADULT NURSE NOTE - NS ED NURSE LEVEL OF CONSCIOUSNESS AFFECT
HEMATOLOGY ONCOLOGY PROGRESS NOTE     Cole Amaya is a 67 year old male at Hospital Day ( LOS: 12 days )    Assessment/Plan:     Ischemic cardiomyopathy with acute on chronic systolic heart failure and cardiogenic shock  -S/p cardiac arrest x3  -Pericardial tamponade s/p pararenal drain 3/4/2022  -S/p IABP 3/9/2022  -S/p [facility] ECMO 3/10/2022  -Undergoing evaluation for advanced heart failure therapies     Positive PF4  -OD positive at 1.004, ANUJ positive as well  -While platelets are normal, they have dropped >50% since admission, this is consistent with HIT  -Was on argatroban gtt, but held for epistaxis  -Avoid heparin exposure      Acute hypoxemic respiratory failure  -Due to cardiogenic shock  -Extubated 3/11/2022     Acute blood loss anemia  -Due to operative blood loss, consumption from ECMO/IABP, acute illness  -Recurrent epistaxis after he pulled out rhinorocket; this was replaced, bleeding now better  - Hgb 7.5, Down trending gradually      Reactive leukocytosis and thrombocytosis  -Platelet count has normalized  -WBC down trending , differential unremarkable     E. coli bacteremia  -Identified on blood culture from OSH  -Blood culture 3/9/2022 at [facility] is no growth to date  -ID on consult, patient was placed on ceftriaxone (off)     AUNG  Hypernatremia  -Creatinine normalized  -Hypernatremia may be contributing to encephalopathy  -Nephrology on consult     Acute liver injury  -Transaminases stable     Hypertension  Osteoarthritis  Consumptive coagulopathy   Fibrinogen 128, PT/PTT normal   Will receive cryoprecipitate today     Per records, patient is up-to-date with age-appropriate cancer screening  Per history provided, there is no indication of inherited cancer syndrome, bleeding disorder, or primary thrombophilia    Will follow      Subjective:   Interval History:     Patient still coherent today   No active bleeding, rhinorocket remains in place in RT Nostril   Denies headache or blurry vision, no chest  pain or shortness of breath, no nausea vomiting or diarrhea, no abdominal pain, otherwise all review of systems negative    Patient Active Problem List   Diagnosis   • Pericardial effusion with cardiac tamponade   • E. coli bacteremia   • Heart failure (CMS/HCC)   • Acute MI, anterior wall (CMS/HCC)   • Ascending aortic aneurysm (CMS/HCC)   • Bicuspid aortic valve with ascending aorta 4.0 to 4.5 cm in diameter   • Essential (primary) hypertension   • Encounter for palliative care   • Goals of care, counseling/discussion   • Advance care planning        Current Facility-Administered Medications   Medication Dose Route Frequency Provider Last Rate Last Admin   • sodium chloride 0.9% infusion   Intravenous Continuous PRN Mireille Gilbert MD       • isosorbide dinitrate (ISORDIL) tablet 20 mg  20 mg Oral 3 times per day Mireille Gilbert MD       • niCARdipine (CARDENE) 125 mg/250 mL in dextrose 5% concentrated infusion  0-15 mg/hr Intravenous Continuous Celso Peralta PA-C 30 mL/hr at 03/20/22 0856 15 mg/hr at 03/20/22 0856   • pantoprazole (PROTONIX) EC tablet 40 mg  40 mg Oral QAM AC Mireille Gilbert MD   40 mg at 03/20/22 0553   • dextrose 5 % infusion   Intravenous Continuous Jaden Ricks MD 50 mL/hr at 03/20/22 0759 Rate Verify at 03/20/22 0759   • melatonin tablet 6 mg  6 mg Oral Nightly Chinedu Fisher MD   6 mg at 03/19/22 2158   • potassium CHLORIDE 40 mEq/100 mL IVPB premix  40 mEq Intravenous Q4H PRN Keaton Goldman MD   Completed at 03/19/22 1309   • furosemide (LASIX) tablet 20 mg  20 mg Oral BID Blaise Thomas CNP   20 mg at 03/19/22 1711   • acetaZOLAMIDE (DIAMOX) injection 250 mg  250 mg Intravenous Q12H Mireille Gilbert MD   250 mg at 03/19/22 1048   • AMIODarone (PACERONE) tablet 200 mg  200 mg Oral Daily ARPIT Stewart   200 mg at 03/19/22 1000   • potassium CHLORIDE (KLOR-CON) packet 20 mEq  20 mEq Oral 3 times per day Heike Gusman MD   20 mEq at 03/20/22 0553   • hydrALAZINE  (APRESOLINE) tablet 100 mg  100 mg Oral 3 times per day Heike Gusman MD   100 mg at 03/20/22 0553   • milrinone (PRIMACOR) 20 mg/100 mL in dextrose 5 % infusion premix  0.25 mcg/kg/min (Dosing Weight) Intravenous Continuous Juan Lawrence MD 8.9 mL/hr at 03/20/22 0759 0.25 mcg/kg/min at 03/20/22 0759   • potassium CHLORIDE 20 MEQ/50ML IVPB premix 20 mEq  20 mEq Intravenous Q4H PRN Blayne Causey MD 50 mL/hr at 03/20/22 0551 20 mEq at 03/20/22 0551   • magnesium sulfate 2 g in 50 mL premix IVPB  2 g Intravenous PRN Blayne Causey MD 25 mL/hr at 03/19/22 1439 2 g at 03/19/22 1439   • insulin lispro (ADMELOG,HumaLOG) - Correction Dose   Subcutaneous 4 times per day Jess Nunes MD   2 Units at 03/19/22 1711   • acetaminophen (TYLENOL) tablet 650 mg  650 mg Oral Q6H PRN Celso Peralta PA-C   650 mg at 03/17/22 0028   • morphine injection 4 mg  4 mg Intravenous Q1H PRN Celso Peralta PA-C       • fentaNYL (SUBLIMAZE) injection 25 mcg  25 mcg Intravenous Q1H PRN Celso Peralta PA-C   25 mcg at 03/18/22 0054   • ondansetron (ZOFRAN ODT) disintegrating tablet 4 mg  4 mg Oral Q12H PRN Celso Peralta PA-C        Or   • ondansetron (ZOFRAN) injection 4 mg  4 mg Intravenous Q6H PRN Celso Peralta PA-C       • MIDAZolam (VERSED) injection 1 mg  1 mg Intravenous Q1H PRN Celso Peralta PA-C       • docusate sodium-sennosides (SENOKOT S) 50-8.6 MG 2 tablet  2 tablet Oral BID PRN Celso Peralta PA-C       • bisacodyl (DULCOLAX) suppository 10 mg  10 mg Rectal Daily PRN Celso Peralta PA-C       • magnesium hydroxide (MILK OF MAGNESIA) 400 MG/5ML suspension 30 mL  30 mL Oral Q12H PRN Celso Peralta PA-C       • sodium biphosphate (FLEET) 7-19 GM/118ML enema 1 enema  1 enema Rectal Once PRN Celso Peralta PA-C       • dexMEDETomidine (PRECEDEX) 400 mcg/100 mL in sodium chloride 0.9 % infusion  0-0.7 mcg/kg/hr (Dosing Weight) Intravenous Continuous PRN Celso Peralta PA-C   Paused at 03/15/22 1912   •  dextrose 5 % / sodium chloride 0.45% with KCl 40 mEq infusion   Intravenous Continuous PRN Juan Lawrence MD 10 mL/hr at 03/20/22 0759 Rate Verify at 03/20/22 0759   • sodium chloride 0.9% infusion   Intravenous Continuous ESPINOZA Alejandre-C 3 mL/hr at 03/12/22 1200 New Bag at 03/12/22 1200   • sodium chloride 0.9% infusion   Intravenous Continuous ESPINOZA Alejandre-C 0.4 mL/hr at 03/10/22 2015 Rate Change at 03/10/22 2015   • sodium chloride 0.9% infusion   Intravenous Continuous ARSENIO AlejandreC 20 mL/hr at 03/16/22 1038 New Bag at 03/16/22 1038   • albumin human 5 % injection 12.5 g  12.5 g Intravenous PRN ARSENIO AlejandreC 250 mL/hr at 03/16/22 2125 12.5 g at 03/16/22 2125   • dextrose 5 % infusion   Intravenous Continuous PRN Celso Peralta PA-C       • NORepinephrine (LEVOPHED) 8 mg/250 mL in sodium chloride 0.9 % infusion  0-100 mcg/min Intravenous Continuous PRN Celso Peralta PA-C   Paused at 03/11/22 2103   • HYDROcodone-acetaminophen (NORCO) 5-325 MG per tablet 1 tablet  1 tablet Oral Q4H PRN Celso Peralta PA-C        Or   • oxyCODONE-acetaminophen (PERCOCET) 5-325 MG tablet 1 tablet  1 tablet Oral Q4H PRN Celso Peralta PA-C       • oxyCODONE-acetaminophen (PERCOCET)  MG tablet 1 tablet  1 tablet Oral Q4H PRN Celso Peralta PA-C        Or   • HYDROcodone-acetaminophen (NORCO)  MG per tablet 1 tablet  1 tablet Oral Q4H PRN Celso Peralta PA-C   1 tablet at 03/17/22 2007   • lidocaine (cardiac) (XYLOCAINE) PF syringe 122 mg  1 mg/kg Intravenous Q5 Min PRN Celso Peralta PA-C   122 mg at 03/15/22 4717   • sodium bicarbonate 8.4 % injection 50 mEq  50 mEq Intravenous PRN Celso Peralta PA-C       • chlorhexidine gluconate (PERIDEX) 0.12 % solution 15 mL  15 mL Swish & Spit PRN Celso Peralta PA-C       • Potassium Standard Replacement Protocol   Does not apply See Admin Instructions Celso Peralta PA-C       • Magnesium Standard Replacement Protocol   Does not apply See  Admin Instructions Celso Peralta PA-C       • sodium chloride 0.9 % flush bag 25 mL  25 mL Intravenous PRN Celso Peralta PA-C       • dextrose 50 % injection 25 g  25 g Intravenous PRN Celso Peralta PA-C       • dextrose 50 % injection 12.5 g  12.5 g Intravenous PRN Celso Peralta PA-C       • glucagon (GLUCAGEN) injection 1 mg  1 mg Intramuscular PRN Celso Peralta PA-C       • dextrose (GLUTOSE) 40 % gel 15 g  15 g Oral PRN Celso Peralta PA-C       • dextrose (GLUTOSE) 40 % gel 30 g  30 g Oral PRN Celso Peralta PA-C       • lidocaine (XYLOCAINE) 2,000 mg/500 mL in dextrose 5 % infusion  0.5 mg/min Intravenous Continuous MIR Blackwell 7.5 mL/hr at 03/18/22 1759 0.5 mg/min at 03/18/22 1759         Objective:     Visit Vitals  /83   Pulse 92   Temp 99 °F (37.2 °C) (Axillary)   Resp 18   Ht 6' 1\" (1.854 m)   Wt 119.1 kg (262 lb 9.1 oz)   SpO2 98%   BMI 34.64 kg/m²        Physical Exam:  Constitutional: Coherent, no distress  Head: Normocephalic   Eyes: Sclera anicteric, pupils equal.   Respiratory: Lungs are clear to auscultation. Good air entry bilaterally    Cardiovascular: Regular rate and rhythm  Abdomen: Soft, non-distended, non-tender.  Extremities: No visual deformities or edema.   Right nostril rhinorocket noted. No blood oozing    I&O:     Intake/Output Summary (Last 24 hours) at 3/20/2022 0907  Last data filed at 3/20/2022 0759  Gross per 24 hour   Intake 2205.41 ml   Output 3180 ml   Net -974.59 ml       Data Review:  Recent Labs   Lab 03/20/22 0335 03/19/22 2115 03/19/22 0319   WBC  --   --  12.0*   RBC  --   --  2.70*   HGB 7.4*   < > 7.7*   HCT 24.2*   < > 24.6*     --  175    < > = values in this interval not displayed.     Recent Labs   Lab 03/20/22 0335   SODIUM 148*   POTASSIUM 3.7   CHLORIDE 118*   CO2 26   BUN 32*   CREATININE 0.90   CALCIUM 8.6   ALBUMIN 2.3*   BILIRUBIN 0.9   ALKPT 127*   GPT 41   AST 61*   GLUCOSE 155*     Recent Labs   Lab 03/20/22  0335    PTT 27   PT 13.5*   INR 1.3       XR CHEST PA OR AP 1 VIEW    Result Date: 3/18/2022  PROCEDURE: XR CHEST PA OR AP 1 VIEW HISTORY: Post ECMO TECHNIQUE: One view of the chest obtained in frontal projection. COMPARISON: 2022 FINDINGS: Lungs, Airways, & Pleurae: Stable streaky left retrocardiac opacity suggestive of subsegmental atelectasis and/or scarring.  No large pleural effusion or pneumothorax. Heart & Mediastinum: Stable cardiomegaly. Medical Devices: Intra-aortic balloon pump marker approximately 9 cm below the top of the aortic arch.  Left internal jugular central venous catheter tip overlies the upper SVC.  ECMO cannula tip overlies the inferior cavoatrial junction. Other Findings: Degenerative changes affect the spine.      Stable cardiomegaly.  Devices as above. Electronically Signed by: KALA YADAV M.D. Signed on: 3/18/2022 8:25 AM     TRANSTHORACIC ECHO (TTE) COMPLETE W/ W/O IMAGING AGENT    *[facility]* [address] [address] [phone] Transthoracic Echocardiogram (TTE) Patient: Cole Amaya    Study Date/Time:      Mar 17 2022 6:58PM MRN:     [id number]            FIN#:                 [id number] :     1954         Ht/Wt:                185cm 123kg Age:     67                 BSA/BMI:              2.44m^2 35.9kg/m^2 Gender:  M                  Baseline BP:          107 / 83 Ordering Physician:    Keaton Goldman MD  Referring Physician:   Frankie Ortez MD  Attending Physician:   Liberty Resendez  Diagnostic Physician:  Mo Aggarwal MD Sonographer:           Prateek Garcia  -------------------------------------------------------------------------- INDICATIONS:   ECMO 4000. -------------------------------------------------------------------------- STUDY CONCLUSIONS SUMMARY: 1. Left ventricle: The cavity size is dilated. Wall thickness is severely    increased. The ejection fraction was measured by visual estimation. The    ejection fraction is  10-15%. 2. Aortic valve: Mild regurgitation. 3. Mitral valve: Trivial regurgitation. 4. Left atrium: The atrium is moderately dilated. 5. Tricuspid valve: Trivial regurgitation. 6. Pericardium, extracardiac: A small pericardial effusion is identified    circumferential to the heart. There is a left pleural effusion. -------------------------------------------------------------------------- STUDY DATA:   Procedure:  Transthoracic echocardiography was performed. Image quality was good.  M-mode, complete 2D, complete spectral Doppler, and color Doppler.  Study status:  Routine.  Study completion:  There were no complications. FINDINGS LEFT VENTRICLE:  The cavity size is dilated. Wall thickness is severely increased. Systolic function is severely reduced. Regional wall motion abnormalities cannot be excluded. Unable to accurately assess left ventricular diastolic function parameters due to heart block.    The ejection fraction was measured by visual estimation. The ejection fraction is 10-15%. The study is not technically sufficient to allow evaluation of LV diastolic function. AORTIC VALVE:  The annulus is normal-sized and mildly to moderately calcified. The valve is trileaflet. The leaflets are mildly thickened. Doppler:  Transvalvular velocity is within the normal range. There is no stenosis.  Mild regurgitation. AORTA:  Aortic root: The aortic root is normal in size. Ascending aorta: The ascending aorta is mildly dilated. MITRAL VALVE:  The annulus is normal-sized. The annulus is mildly calcified. The leaflets are mildly thickened.  Doppler:  Transvalvular velocity is within the normal range. There is no evidence for stenosis. Trivial regurgitation. LEFT ATRIUM:  The atrium is moderately dilated. RIGHT VENTRICLE:  The cavity size is normal. Systolic function is normal. PULMONIC VALVE:   Poorly visualized.  Doppler:  Transvalvular velocity is within the normal range. There is no evidence for stenosis.  No  regurgitation. TRICUSPID VALVE:  The annulus is normal-sized. The leaflets are normal thickness.  Doppler:  Transvalvular velocity is within the normal range. There is no evidence for stenosis.  Trivial regurgitation. RIGHT ATRIUM:  The atrium is dilated. ECMO CANNULA VISUALIZED. PERICARDIUM:  A small pericardial effusion is identified circumferential to the heart. There is a left pleural effusion. BASELINE ECG:   Sinus arrhythmia. -------------------------------------------------------------------------- Measurements  Left ventricle        Value        03/14/2022 Ref        Right ventricle       Value        03/14/2022 Ref  SILVANA, LAX     (H)      6.3   cm     6.5        4.2 - 5.8  TAPSE, MM             1.8   cm     ---------- 1.7 - 3.1  chord  ESD, LAX     (H)      6.1   cm     6.1        2.5 - 4.0  Left atrium           Value        03/14/2022 Ref  chord                                                    AP dim, ES            3.9   cm     3.7        3.0 - 4.0  SILVANA/bsa, LAX          2.6   cm/m^2 2.7        2.2 - 3.0  AP dim index          1.6   cm/m^2 1.5        1.5 - 2.3  chord                                                    Area ES, A4C   (H)    28    cm^2   25         <=20  ESD/bsa, LAX (H)      2.5   cm/m^2 2.5        1.3 - 2.1  Area ES, A2C          26    cm^2   21         ---------  chord                                                    Vol, S         (H)    102   ml     90         18 - 58  PW, ED, LAX  (H)      1.9   cm     2.0        0.6 - 1.0  Vol/bsa, S     (H)    42    ml/m^2 37         16 - 34  SILVANA major             9.7   cm     9.3        ---------  Vol, ES, 1-p   (H)    98    ml     91         18 - 58  ax, A4C                                                  A4C  ESD major             9.2   cm     8.8        ---------  Vol/bsa, ES,   (H)    40    ml/m^2 37         12 - 37  ax, A4C                                                  1-p A4C  FS major              6     %      6          ---------   Vol, ES, 1-p   (H)    93    ml     55         18 - 58  axis, A4C                                                A2C  SILVANA/bsa               4.0   cm/m^2 3.8        ---------  Vol/bsa, ES,          38    ml/m^2 23         11 - 43  major ax,                                                1-p A2C  A4C                                                      Vol, ES, 2-p          102   ml     71         ---------  ESD/bsa               3.8   cm/m^2 3.6        ---------  Vol/bsa, ES,   (H)    42    ml/m^2 29         16 - 34  major ax,                                                2-p  A4C  TONY, A4C              55.1  cm^2   56.8       ---------  Tricuspid valve       Value        03/14/2022 Ref  JOSH, A4C              47.5  cm^2   46.6       ---------  TR peak v             2.2   m/sec  ---------- <=2.8  FAC, A4C              14    %      18         ---------  Peak RV-RA            19    mm Hg  ---------- ---------  PW, ED       (H)      1.9   cm     2.0        0.6 - 1.0  grad, S  IVS/PW, ED            1.01         0.81       ---------  Max TR tamy            2.19  m/sec  ---------- ---------  EDV          (H)      254   ml     276        62 - 150  ESV          (H)      223   ml     230        21 - 61    Aortic root           Value        03/14/2022 Ref  EF           (L)      15    %      15         52 - 72    Root diam, ED         3.9   cm     ---------- <4.5  SV                    19    ml     28         ---------  EDV/bsa      (H)      104   ml/m^2 114        34 - 74    Ascending aorta       Value        03/14/2022 Ref  ESV/bsa      (H)      91    ml/m^2 95         11 - 31    AAo AP diam,   (H)    4.3   cm     3.5        2.2 - 3.8  SV/bsa                8     ml/m^2 12         ---------  ED  SV, 1-p A4C           55    ml     75         ---------  AAo AP                1.8   cm/m^2 1.4        1.1 - 1.9  SV/bsa, 1-p           23    ml/m^2 31         ---------  diam/bsa, ED  A4C  ESV, 2-p     (H)      198   ml     206         21 - 61  ESV/bsa, 2-p (H)      81    ml/m^2 85         11 - 31   Ventricular septum    Value        03/14/2022 Ref  IVS, ED      (H)      1.9   cm     1.6        0.6 - 1.0 Legend: (L)  and  (H)  renato values outside specified reference range. Prepared and electronically signed by Mo Aggarwal MD 03/18/2022 07:44      I have personally reviewed all pertinent patient data.   D/w RN in round. Discussed with patient   Mignon rios MD 3/20/2022, 9:07 AM   Calm

## 2024-09-11 NOTE — CONSULT NOTE ADULT - ASSESSMENT
Patient is a 59 year old man admitted 9/11/24. He states last evening at 11 PM he noted that his speech was slurred and left facial weakness. He went to sleep and woke up and still noted no change. Today, he continued with the left facial weakness and slurred speech and came to the ED. He denies HA or other associated neurological complaints. He denies fever, chest pain, trauma. Neurological exam as above.    1) CT Head as above no acute intracranial hemorrhage  2) CTA Head as above anterior and posterior circulation patent,  no proximal large vessel occlusion  3) CTA Neck as above no hemodynamically significant narrowing      With regard to the left facial paresis and dysarthria, would be concerned with acute cerebral ischemia    1) ASA 81 mg and Plavix 75 mg daily for 3 weeks after 3 weeks, discontinue Plavix and continue ASA alone  2) MRI Head no contrast evaluate acute cerebral ischemia  3) Echocardiogram  4) Cardiology consult for outpatient extended cardiac monitoring with external cardiac monitor or ILR

## 2024-09-11 NOTE — H&P ADULT - NSHPLABSRESULTS_GEN_ALL_CORE
LABS:                        16.2   11.29 )-----------( 221      ( 11 Sep 2024 13:20 )             47.1     09-11    138  |  101  |  19  ----------------------------<  90  4.2   |  31  |  1.18    Ca    8.8      11 Sep 2024 13:20    TPro  6.8  /  Alb  3.9  /  TBili  0.6  /  DBili  x   /  AST  26  /  ALT  25  /  AlkPhos  97  09-11    PT/INR - ( 11 Sep 2024 13:20 )   PT: 10.8 sec;   INR: 0.95 ratio       PTT - ( 11 Sep 2024 13:20 )  PTT:38.1 sec  Urinalysis Basic - ( 11 Sep 2024 13:20 )    Color: x / Appearance: x / SG: x / pH: x  Gluc: 90 mg/dL / Ketone: x  / Bili: x / Urobili: x   Blood: x / Protein: x / Nitrite: x   Leuk Esterase: x / RBC: x / WBC x   Sq Epi: x / Non Sq Epi: x / Bacteria: x    CAPILLARY BLOOD GLUCOSE    POCT Blood Glucose.: 95 mg/dL (11 Sep 2024 13:10)    RADIOLOGY & ADDITIONAL TESTS:    Consultant(s) Notes Reviewed:  [x ] YES  [ ] NO  Care Discussed with Consultants/Other Providers [x] YES  [ ] NO d/w Dr. Hyde, Dr. Garcia  Imaging Personally Reviewed:  [] YES  [ ] NO

## 2024-09-11 NOTE — H&P ADULT - NSHPPHYSICALEXAM_GEN_ALL_CORE
Wt(kg): --Vital Signs Last 24 Hrs  T(C): 36.8 (11 Sep 2024 13:15), Max: 36.8 (11 Sep 2024 13:15)  T(F): 98.3 (11 Sep 2024 13:15), Max: 98.3 (11 Sep 2024 13:15)  HR: 82 (11 Sep 2024 15:20) (82 - 104)  BP: 121/75 (11 Sep 2024 15:20) (111/66 - 151/90)  BP(mean): --  RR: 18 (11 Sep 2024 15:20) (18 - 18)  SpO2: 100% (11 Sep 2024 15:20) (96% - 100%)    Parameters below as of 11 Sep 2024 15:20  Patient On (Oxygen Delivery Method): room air    PHYSICAL EXAM:  GENERAL: NAD, well-groomed, well-developed  NERVOUS SYSTEM:  Alert & Oriented X3, Good concentration; Motor Strength 5/5 B/L upper and lower extremities; +facial asymmetry. +dysarthria   HEAD:  Atraumatic, Normocephalic  EYES: EOMI, PERRLA, conjunctiva and sclera clear  ENMT: No tonsillar erythema, exudates, or enlargement; Moist mucous membranes, Good dentition, No lesions  NECK: Supple, No JVD, Normal thyroid  CHEST/LUNG: Clear to percussion bilaterally; No rales, rhonchi, wheezing, or rubs  HEART: Regular rate and rhythm; No murmurs, rubs, or gallops  ABDOMEN: Soft, Nontender, Nondistended; Bowel sounds present  EXTREMITIES:  2+ Peripheral Pulses, No clubbing, cyanosis, or edema  LYMPH: No lymphadenopathy noted  SKIN: No rashes or lesions

## 2024-09-11 NOTE — CONSULT NOTE ADULT - SUBJECTIVE AND OBJECTIVE BOX
Patient is a 59 year old man admitted 9/11/24. He states last evening at 11 PM he noted that his speech was slurred and left facial weakness. He went to sleep and woke up and still noted no change. Today, he continued with the left facial weakness and slurred speech and came to the ED. He denies HA or other associated neurological complaints. He denies fever, chest pain, trauma.    PMH: As above          Depression          ADHD    SH:  Allergy-epinephrine    Exam: Awake,alert, appropriate            Speech-dysarthric           Pupils 2.5mm, EOM intact, no nystagmus, VFF          CN II-XII intact except Left VII, delay in smiling on left, flattening of left NLF          Motor tone and strength  RUE 5/5     LUE  5/5                                                RLE  5/5    LLE   5/5                          16.2   11.29 )-----------( 221      ( 11 Sep 2024 13:20 )             47.1     09-11    138  |  101  |  19  ----------------------------<  90  4.2   |  31  |  1.18    Ca    8.8      11 Sep 2024 13:20    TPro  6.8  /  Alb  3.9  /  TBili  0.6  /  DBili  x   /  AST  26  /  ALT  25  /  AlkPhos  97  09-11    < from: CT Brain Stroke Protocol (09.11.24 @ 13:28) >    ACC: [id number] EXAM:  CT ANGIO BRAIN STROKE PROTC IC   ORDERED BY: RAMON LOWERY     ACC: [id number] EXAM:  CT ANGIO NECK STROKE PROTCL IC   ORDERED BY: RAMON LOWERY     ACC: [id number] EXAM:  CT BRAIN STROKE PROTOCOL   ORDERED BY: RAMON LOWERY     ACC: [id number] EXAM:  CT BRAIN PERFUSION MAPS STROKE   ORDERED BY: RAMON LOWERY     PROCEDURE DATE:  09/11/2024          INTERPRETATION:  CT HEAD STROKE PROTOCOL, CT PERFUSION WITH MAPS STROKE   PROTOCOL, CT ANGIO NECK STROKE PROTOCOL, CT ANGIO HEAD STROKE PROTOCOL    CLINICAL HISTORY: Stroke Code. Slurred speech. Left-sided facial droop    CT HEAD TECHNIQUE:  Noncontrast CT.  Axial acquisition.  Sagittal and coronal reformations.    COMPARISON:  Prior head CT of 8/8/2012    FINDINGS:  HEMORRHAGE: No evidence of intracranial hemorrhage.  BRAIN PARENCHYMA:  No abnormal regions of parenchymal attenuation.  No   mass or mass effect.  VENTRICLES / SHIFT:  No hydrocephalus. No midline shift.  EXTRA-AXIAL / BASAL CISTERNS:  No extra-axial mass. Basal cisterns   preserved.  CALVARIUM AND EXTRACRANIAL SOFT TISSUES:  No depressed calvarial fracture.  SINUSES, ORBITS, MASTOIDS:  Mild regions of mucosal thickening within the   paranasal sinuses. Visualized mastoids and middle ear are well-aerated.  ----------------------------------------    CTA TECHNIQUE:  CT angiography of the neck and brain was performed during the dynamic   administration of intravenous contrast.  MIP reconstructions were   performed and reviewed. Multiplanar reformations were obtained.  Contrast administered: 75 cc Omipaque 350.  Contrast discarded: 0 cc.    CTA FINDINGS:  NECK  RIGHT CAROTID CIRCULATION:  Evaluation of the right carotid circulation demonstrates no hemodynamic   significant narrowing utilizing a distal reference.  LEFT CAROTID CIRCULATION:  Evaluation of the left carotid circulation demonstrates no hemodynamic   significant narrowing utilizing a distal reference.  VERTEBRAL ARTERIES:  Evaluation of the vertebral arteries reveals no evidence of a vertebral   artery occlusion or dissection.    BRAIN  ANTERIOR CIRCULATION:  Distal internal carotid arteries are patent.  Anterior cerebral arteries are patent.  Middle cerebral arteries are patent.  POSTERIOR CIRCULATION:  Distal vertebral arteries arepatent.  A right posterior inferior   cerebellar artery is seen  Basilar artery is patent.  Proximal superior cerebellar arteries are   patent  Posterior cerebral arteries are patent.    OTHER:  Gross patency of the large dural sinuses.  --------------------------------------------------    CT PERFUSION TECHNIQUE:  CT perfusion was performed during the administration of intravenous   contrast.  There was a total of 8 - 10 cm brain coverage.  The images were processed utilizing RAPID software.  Contrast administered: 75 cc Omipaque 350.  Contrast discarded: 0 cc.    Ischemic tissue is defined as TMAX > 6 seconds.  Core infarct is defined as CBF < 30%.    CT PERFUSION FINDINGS:  Perfusion imaging failed due to technical issues      IMPRESSION:  HEAD CT: No evidence of an acute intracranial hemorhage, midline shift or   hydrocephalus.  NECK CTA: No hemodynamic significant narowing within the neck.  BRAIN CTA: No proximal large vessel occlusion.  CT PERFUSION: Failed due to technical issues.    Preliminary head CT findings discussed with Dr LOWERY at 1:25 PM on   9/11/2024    --- End of Report ---            VALENTIN INGRAM MD; Attending Radiologist  This document has been electronically signed. Sep 11 2024  1:37PM    < end of copied text >

## 2024-09-11 NOTE — ED PROVIDER NOTE - CLINICAL SUMMARY MEDICAL DECISION MAKING FREE TEXT BOX
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 Garcia neurology on-call Given aspirin and atorvastatin plan to admit the patient

## 2024-09-11 NOTE — ED ADULT NURSE NOTE - OBJECTIVE STATEMENT
Pt came from home with c/o slurred speech and left sided facial droop. Pts LKW= 11pm last night before going to bed. Pt with hx anxiety, depression and ADHD. States that he did not want to come to the ED for eval, but his co workers advised him to come for an eval. Pt noted to have a left sided facial droop and slurred speech on arrival. FS= 95 on arrival. Pt evaluated by MD in triage. Code stroke called and pt brought to CT immediately.

## 2024-09-11 NOTE — ED PROVIDER NOTE - OBJECTIVE STATEMENT
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

## 2024-09-11 NOTE — H&P ADULT - HISTORY OF PRESENT ILLNESS
JENNIFER EDWARD is a 59y year old Male with PMH of ADHD, Depression, Active Smoker who presents to the ER complaining of slurred speech, left facial weakness since 11pm last night.     Patient states symptoms started suddenly yesterday evening, he went to sleep but did not resolve upon awakening. He went to work when his coworkers urged him to come to ER. Denies weakness of arms/legs. Does endorse heaviness of his face.     On ER arrival: /90; ; RR 18; T 98.3; sat 96% on room air  Code stroke CT imaging showing no hemorrhage, no proximal large vessel occlusion.

## 2024-09-12 ENCOUNTER — RESULT REVIEW (OUTPATIENT)
Age: 59
End: 2024-09-12

## 2024-09-12 ENCOUNTER — TRANSCRIPTION ENCOUNTER (OUTPATIENT)
Age: 59
End: 2024-09-12

## 2024-09-12 LAB
A1C WITH ESTIMATED AVERAGE GLUCOSE RESULT: 5.4 % — SIGNIFICANT CHANGE UP (ref 4–5.6)
ALBUMIN SERPL ELPH-MCNC: 3.4 G/DL — SIGNIFICANT CHANGE UP (ref 3.3–5)
ALP SERPL-CCNC: 97 U/L — SIGNIFICANT CHANGE UP (ref 40–120)
ALT FLD-CCNC: 25 U/L — SIGNIFICANT CHANGE UP (ref 10–45)
ANION GAP SERPL CALC-SCNC: 5 MMOL/L — SIGNIFICANT CHANGE UP (ref 5–17)
AST SERPL-CCNC: 26 U/L — SIGNIFICANT CHANGE UP (ref 10–40)
BILIRUB SERPL-MCNC: 0.6 MG/DL — SIGNIFICANT CHANGE UP (ref 0.2–1.2)
BUN SERPL-MCNC: 20 MG/DL — SIGNIFICANT CHANGE UP (ref 7–23)
CALCIUM SERPL-MCNC: 8.3 MG/DL — LOW (ref 8.4–10.5)
CHLORIDE SERPL-SCNC: 101 MMOL/L — SIGNIFICANT CHANGE UP (ref 96–108)
CO2 SERPL-SCNC: 30 MMOL/L — SIGNIFICANT CHANGE UP (ref 22–31)
CREAT SERPL-MCNC: 1.14 MG/DL — SIGNIFICANT CHANGE UP (ref 0.5–1.3)
EGFR: 73 ML/MIN/1.73M2 — SIGNIFICANT CHANGE UP
ESTIMATED AVERAGE GLUCOSE: 108 MG/DL — SIGNIFICANT CHANGE UP (ref 68–114)
GLUCOSE SERPL-MCNC: 104 MG/DL — HIGH (ref 70–99)
HCT VFR BLD CALC: 47.3 % — SIGNIFICANT CHANGE UP (ref 39–50)
HGB BLD-MCNC: 16.1 G/DL — SIGNIFICANT CHANGE UP (ref 13–17)
MCHC RBC-ENTMCNC: 31.4 PG — SIGNIFICANT CHANGE UP (ref 27–34)
MCHC RBC-ENTMCNC: 34 GM/DL — SIGNIFICANT CHANGE UP (ref 32–36)
MCV RBC AUTO: 92.2 FL — SIGNIFICANT CHANGE UP (ref 80–100)
NRBC # BLD: 0 /100 WBCS — SIGNIFICANT CHANGE UP (ref 0–0)
PLATELET # BLD AUTO: 184 K/UL — SIGNIFICANT CHANGE UP (ref 150–400)
POTASSIUM SERPL-MCNC: 4.2 MMOL/L — SIGNIFICANT CHANGE UP (ref 3.5–5.3)
POTASSIUM SERPL-SCNC: 4.2 MMOL/L — SIGNIFICANT CHANGE UP (ref 3.5–5.3)
PROT SERPL-MCNC: 6.2 G/DL — SIGNIFICANT CHANGE UP (ref 6–8.3)
RBC # BLD: 5.13 M/UL — SIGNIFICANT CHANGE UP (ref 4.2–5.8)
RBC # FLD: 13.4 % — SIGNIFICANT CHANGE UP (ref 10.3–14.5)
SODIUM SERPL-SCNC: 136 MMOL/L — SIGNIFICANT CHANGE UP (ref 135–145)
WBC # BLD: 8.59 K/UL — SIGNIFICANT CHANGE UP (ref 3.8–10.5)
WBC # FLD AUTO: 8.59 K/UL — SIGNIFICANT CHANGE UP (ref 3.8–10.5)

## 2024-09-12 PROCEDURE — 99239 HOSP IP/OBS DSCHRG MGMT >30: CPT

## 2024-09-12 PROCEDURE — 93306 TTE W/DOPPLER COMPLETE: CPT | Mod: 26

## 2024-09-12 PROCEDURE — 70551 MRI BRAIN STEM W/O DYE: CPT | Mod: 26

## 2024-09-12 PROCEDURE — 99232 SBSQ HOSP IP/OBS MODERATE 35: CPT

## 2024-09-12 RX ADMIN — Medication 80 MILLIGRAM(S): at 21:08

## 2024-09-12 RX ADMIN — Medication 75 MILLIGRAM(S): at 12:15

## 2024-09-12 RX ADMIN — ENOXAPARIN SODIUM 40 MILLIGRAM(S): 100 INJECTION SUBCUTANEOUS at 18:00

## 2024-09-12 RX ADMIN — Medication 81 MILLIGRAM(S): at 12:15

## 2024-09-12 RX ADMIN — Medication 1 MILLIGRAM(S): at 17:59

## 2024-09-12 NOTE — PROGRESS NOTE ADULT - ASSESSMENT
59y year old Male with PMH of ADHD, Depression, Active Smoker who presents to the ER complaining of slurred speech, left facial weakness.     #Acute CVA  - ASA 81 daily, plavix 75mg daily (for 3 weeks then dc), lipitor 80mg qhs  - Lipid profile, A1C pending   - telemetry monitoring to r/o arrhythmia   - TTE  - MR head pending   - DASH diet   - PT/OT consult - independent   - Neurology following     #active Smoker   - offered nicotine patches, patient declined  - counseled on smoking cessation     #Depression  - continue home nardil, klonopin     #ADHD   - continue home ritalin     #DVT Prophylaxis:  - Lovenox     GOC full code     Dispo: pending MRI     9/12 Patient aware and in agreement with plan of care      59y year old Male with PMH of ADHD, Depression, Active Smoker who presents to the ER complaining of slurred speech, left facial weakness.     #Acute CVA  - ASA 81 daily, plavix 75mg daily (for 3 weeks then dc), lipitor 80mg qhs  - Lipid profile reviewed  - A1C - 5.4   - telemetry monitoring to r/o arrhythmia   - TTE  - MR head pending   - DASH diet   - PT/OT consult - independent   - Neurology following     #active Smoker   - offered nicotine patches, patient declined  - counseled on smoking cessation     #Depression  - continue home nardil, klonopin     #ADHD   - continue home ritalin     #DVT Prophylaxis:  - Lovenox     GOC full code     Dispo: pending MRI     9/12 Patient aware and in agreement with plan of care      59y year old Male with PMH of ADHD, Depression, Active Smoker who presents to the ER complaining of slurred speech, left facial weakness.     #Acute CVA  - ASA 81 daily, plavix 75mg daily (for 3 weeks then dc), lipitor 80mg qhs  - telemetry monitoring to r/o arrhythmia   - TTE  - MR head - Abnormal T2 prolongation with restricted diffusion is seen involving the right corona radiata/centrum semiovale region, this is compatible with an acute infarct.   - DASH diet   - Lipid profile reviewed  - A1C - 5.4   - PT/OT consult - independent   - Neurology following     #active Smoker   - offered nicotine patches, patient declined  - counseled on smoking cessation     #Depression  - continue home nardil, klonopin     #ADHD   - continue home ritalin     #DVT Prophylaxis:  - Lovenox     GOC full code     Dispo: pending neuro input      9/12 Patient aware and in agreement with plan of care      59y year old Male with PMH of ADHD, Depression, Active Smoker who presents to the ER complaining of slurred speech, left facial weakness.     #Acute CVA  - ASA 81 daily, plavix 75mg daily (for 3 weeks then dc), lipitor 80mg qhs  - telemetry monitoring to r/o arrhythmia   - TTE - Left ventricular ejection fraction, by visual estimation, is 65 to 70%.  - MR head - Abnormal T2 prolongation with restricted diffusion is seen involving the right corona radiata/centrum semiovale region, this is compatible with an acute infarct.   - DASH diet   - Lipid profile reviewed  - A1C - 5.4   - PT/OT consult - independent   - Neurology - ASA 81 mg and Plavix 75 mg daily for 3 weeks after 3 weeks, discontinue Plavix and continue ASA alone    #active Smoker   - offered nicotine patches, patient declined  - counseled on smoking cessation     #Depression  - continue home nardil, klonopin     #ADHD   - continue home ritalin     #DVT Prophylaxis:  - Lovenox     GO full code     Dispo: pending neuro input      9/12 Patient aware and in agreement with plan of care

## 2024-09-12 NOTE — OCCUPATIONAL THERAPY INITIAL EVALUATION ADULT - MD ORDER
MD order received. Chart reviewed and NP cleared for OT with urgent MRI pending. IE complete- refer below

## 2024-09-12 NOTE — DIETITIAN INITIAL EVALUATION ADULT - PERTINENT MEDS FT
MEDICATIONS  (STANDING):  aspirin enteric coated 81 milliGRAM(s) Oral daily  atorvastatin 80 milliGRAM(s) Oral at bedtime  clonazePAM  Tablet 1 milliGRAM(s) Oral two times a day  clopidogrel Tablet 75 milliGRAM(s) Oral daily  enoxaparin Injectable 40 milliGRAM(s) SubCutaneous every 24 hours  methylphenidate 10 milliGRAM(s) Oral two times a day  phenelzine 15 milliGRAM(s) Oral four times a day    MEDICATIONS  (PRN):  acetaminophen     Tablet .. 650 milliGRAM(s) Oral every 6 hours PRN Temp greater or equal to 38C (100.4F), Mild Pain (1 - 3)  aluminum hydroxide/magnesium hydroxide/simethicone Suspension 30 milliLiter(s) Oral every 4 hours PRN Dyspepsia  melatonin 3 milliGRAM(s) Oral at bedtime PRN Insomnia  ondansetron Injectable 4 milliGRAM(s) IV Push every 8 hours PRN Nausea and/or Vomiting

## 2024-09-12 NOTE — DISCHARGE NOTE PROVIDER - NSDCFUADDAPPT_GEN_ALL_CORE_FT
APPTS ARE READY TO BE MADE: [X] YES    Best Family or Patient Contact (if needed):    Additional Information about above appointments (if needed):    1: Neurology   2:   3:     Other comments or requests:    APPTS ARE READY TO BE MADE: [X] YES    Best Family or Patient Contact (if needed):    Additional Information about above appointments (if needed):    1: Neurology   2: Cardiology   3:     Other comments or requests:    APPTS ARE READY TO BE MADE: [X] YES    Best Family or Patient Contact (if needed):    Additional Information about above appointments (if needed):    1: Neurology   2: Cardiology   3:     Other comments or requests:   Provided patient with provider referral information, however patient prefers to schedule the appointments on their own.

## 2024-09-12 NOTE — OCCUPATIONAL THERAPY INITIAL EVALUATION ADULT - GENERAL OBSERVATIONS, REHAB EVAL
Pt received supine in bed +cardiac monitor agreeable to OT evaluation. Dysarthria noted. Pt tolerated OT well without difficulty. No skilled OT needs at this time. IDT made aware recommendation for SLP consult. Pt left supine in bed with call bell in reach and lines intact

## 2024-09-12 NOTE — DISCHARGE NOTE PROVIDER - CARE PROVIDERS DIRECT ADDRESSES
,DirectAddress_Unknown ,DirectAddress_Unknown,[email or web address]

## 2024-09-12 NOTE — DISCHARGE NOTE PROVIDER - NSDCCPCAREPLAN_GEN_ALL_CORE_FT
PRINCIPAL DISCHARGE DIAGNOSIS  Diagnosis: Acute ischemic stroke  Assessment and Plan of Treatment: MRI - Abnormal T2 prolongation with restricted diffusion is seen involving the   right corona radiata/centrum semiovale region , this is compatible with   an acute infarct.   Please follow up with neurology.   You were seen by neurology here, please continue ASA/plavis for 3 weeks. Then proceed with just Aspirin. Take medication as prescribed  You were given a script for speech therapy after seeing speech pathology in the hospital. Please follow up outpatient     PRINCIPAL DISCHARGE DIAGNOSIS  Diagnosis: Acute ischemic stroke  Assessment and Plan of Treatment: MRI - Abnormal T2 prolongation with restricted diffusion is seen involving the   right corona radiata/centrum semiovale region , this is compatible with   an acute infarct.   Please follow up with neurology.   You were seen by neurology here, please continue ASA/plavis for 3 weeks. Then proceed with just Aspirin. Take medication as prescribed  You were given a script for speech therapy after seeing speech pathology in the hospital. Please follow up outpatient  Please follow up with outpatient cardiology for loop recorder.

## 2024-09-12 NOTE — DISCHARGE NOTE PROVIDER - CARE PROVIDER_API CALL
Dr Jose  Phone: (   )    -  Fax: (   )    -  Follow Up Time:    Dr Jose  Phone: (   )    -  Fax: (   )    -  Follow Up Time:     Rolly Ulloa  Cardiology  [address], [address]  [place] [address]  Phone: [phone]  Fax: [phone]  Follow Up Time:     Joel Abdi  Neurology  [address], [address]  [place] [address]  Phone: [phone]  Fax: [phone]  Follow Up Time:

## 2024-09-12 NOTE — DIETITIAN INITIAL EVALUATION ADULT - PERTINENT LABORATORY DATA
09-12    136  |  101  |  20  ----------------------------<  104<H>  4.2   |  30  |  1.14    Ca    8.3<L>      12 Sep 2024 07:20    TPro  6.2  /  Alb  3.4  /  TBili  0.6  /  DBili  x   /  AST  26  /  ALT  25  /  AlkPhos  97  09-12  POCT Blood Glucose.: 95 mg/dL (09-11-24 @ 13:10)  A1C with Estimated Average Glucose Result: 5.4 % (09-12-24 @ 07:20)

## 2024-09-12 NOTE — OCCUPATIONAL THERAPY INITIAL EVALUATION ADULT - ADDITIONAL COMMENTS
Per pt, pt lives alone in [address] to enter and 0 steps inside. 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 Pt reports owning no DME and did not utilize

## 2024-09-12 NOTE — PROGRESS NOTE ADULT - ASSESSMENT
Patient is a 59 year old man admitted 9/11/24. He states last evening at 11 PM he noted that his speech was slurred and left facial weakness. He went to sleep and woke up and still noted no change. Today, he continued with the left facial weakness and slurred speech and came to the ED. He denies HA or other associated neurological complaints. He denies fever, chest pain, trauma. Neurological exam as above.    1) CT Head as above no acute intracranial hemorrhage  2) CTA Head as above anterior and posterior circulation patent,  no proximal large vessel occlusion  3) CTA Neck as above no hemodynamically significant narrowing      With regard to the left facial paresis and dysarthria, would be concerned with acute cerebral ischemia    1) ASA 81 mg and Plavix 75 mg daily for 3 weeks after 3 weeks, discontinue Plavix and continue ASA alone  2) MRI Head as above restricted diffusion right corona radiata/centrum semiovale compatible with acute infarct.  3) Echocardiogram as above no thrombus  4) Cardiology consult for outpatient extended cardiac monitoring with external cardiac monitor or ILR

## 2024-09-12 NOTE — PROGRESS NOTE ADULT - SUBJECTIVE AND OBJECTIVE BOX
Patient is a 59y old  Male who presents with a chief complaint of dysarthria (11 Sep 2024 17:18)      Patient seen and examined at bedside. No overnight events reported. Patient states he is feeling better     ALLERGIES:  EPINEPHrine Auto-Injector (Other)    MEDICATIONS  (STANDING):  aspirin enteric coated 81 milliGRAM(s) Oral daily  atorvastatin 80 milliGRAM(s) Oral at bedtime  clonazePAM  Tablet 1 milliGRAM(s) Oral two times a day  clopidogrel Tablet 75 milliGRAM(s) Oral daily  enoxaparin Injectable 40 milliGRAM(s) SubCutaneous every 24 hours  methylphenidate 10 milliGRAM(s) Oral two times a day  phenelzine 15 milliGRAM(s) Oral four times a day    MEDICATIONS  (PRN):  acetaminophen     Tablet .. 650 milliGRAM(s) Oral every 6 hours PRN Temp greater or equal to 38C (100.4F), Mild Pain (1 - 3)  aluminum hydroxide/magnesium hydroxide/simethicone Suspension 30 milliLiter(s) Oral every 4 hours PRN Dyspepsia  melatonin 3 milliGRAM(s) Oral at bedtime PRN Insomnia  ondansetron Injectable 4 milliGRAM(s) IV Push every 8 hours PRN Nausea and/or Vomiting    Vital Signs Last 24 Hrs  T(F): 97.7 (12 Sep 2024 05:05), Max: 98.3 (11 Sep 2024 13:15)  HR: 84 (12 Sep 2024 05:05) (74 - 104)  BP: 119/84 (12 Sep 2024 05:05) (111/66 - 151/90)  RR: 18 (12 Sep 2024 05:05) (18 - 18)  SpO2: 95% (12 Sep 2024 05:05) (95% - 100%)  I&O's Summary    11 Sep 2024 07:01  -  12 Sep 2024 07:00  --------------------------------------------------------  IN: 0 mL / OUT: 600 mL / NET: -600 mL      PHYSICAL EXAM:  General: NAD, A/O x 3  ENT: No gross hearing impairment, Moist mucous membranes, no thrush  Neck: Supple, No JVD  Lungs: Clear to auscultation bilaterally, good air entry, non-labored breathing  Cardio: RRR, S1/S2, No murmur  Abdomen: Soft, Nontender, Nondistended; Bowel sounds present  Extremities: No calf tenderness, No cyanosis, No pitting edema  Psych: Appropriate mood and affect  Neuro: left facial droop noted, patient has full ROM in all extremities. sensation intact. Ambulated hallway with PT. Patient still has slight slurred speech     LABS:                        16.1   8.59  )-----------( 184      ( 12 Sep 2024 07:20 )             47.3     09-12    136  |  101  |  20  ----------------------------<  104  4.2   |  30  |  1.14    Ca    8.3      12 Sep 2024 07:20    TPro  6.2  /  Alb  3.4  /  TBili  0.6  /  DBili  x   /  AST  26  /  ALT  25  /  AlkPhos  97  09-12          PT/INR - ( 11 Sep 2024 13:20 )   PT: 10.8 sec;   INR: 0.95 ratio         PTT - ( 11 Sep 2024 13:20 )  PTT:38.1 sec      CARDIAC MARKERS ( 11 Sep 2024 13:20 )  x     / 4.3 ng/L / x     / x     / x                        POCT Blood Glucose.: 95 mg/dL (11 Sep 2024 13:10)      Urinalysis Basic - ( 12 Sep 2024 07:20 )    Color: x / Appearance: x / SG: x / pH: x  Gluc: 104 mg/dL / Ketone: x  / Bili: x / Urobili: x   Blood: x / Protein: x / Nitrite: x   Leuk Esterase: x / RBC: x / WBC x   Sq Epi: x / Non Sq Epi: x / Bacteria: x          RADIOLOGY & ADDITIONAL TESTS:    Care Discussed with Consultants/Other Providers:    Patient is a 59y old  Male who presents with a chief complaint of dysarthria (11 Sep 2024 17:18)      Patient seen and examined at bedside. No overnight events reported. Patient states he is feeling better     ALLERGIES:  EPINEPHrine Auto-Injector (Other)    MEDICATIONS  (STANDING):  aspirin enteric coated 81 milliGRAM(s) Oral daily  atorvastatin 80 milliGRAM(s) Oral at bedtime  clonazePAM  Tablet 1 milliGRAM(s) Oral two times a day  clopidogrel Tablet 75 milliGRAM(s) Oral daily  enoxaparin Injectable 40 milliGRAM(s) SubCutaneous every 24 hours  methylphenidate 10 milliGRAM(s) Oral two times a day  phenelzine 15 milliGRAM(s) Oral four times a day    MEDICATIONS  (PRN):  acetaminophen     Tablet .. 650 milliGRAM(s) Oral every 6 hours PRN Temp greater or equal to 38C (100.4F), Mild Pain (1 - 3)  aluminum hydroxide/magnesium hydroxide/simethicone Suspension 30 milliLiter(s) Oral every 4 hours PRN Dyspepsia  melatonin 3 milliGRAM(s) Oral at bedtime PRN Insomnia  ondansetron Injectable 4 milliGRAM(s) IV Push every 8 hours PRN Nausea and/or Vomiting    Vital Signs Last 24 Hrs  T(F): 97.7 (12 Sep 2024 05:05), Max: 98.3 (11 Sep 2024 13:15)  HR: 84 (12 Sep 2024 05:05) (74 - 104)  BP: 119/84 (12 Sep 2024 05:05) (111/66 - 151/90)  RR: 18 (12 Sep 2024 05:05) (18 - 18)  SpO2: 95% (12 Sep 2024 05:05) (95% - 100%)  I&O's Summary    11 Sep 2024 07:01  -  12 Sep 2024 07:00  --------------------------------------------------------  IN: 0 mL / OUT: 600 mL / NET: -600 mL      PHYSICAL EXAM:  General: NAD, A/O x 3  ENT: No gross hearing impairment, Moist mucous membranes, no thrush  Neck: Supple, No JVD  Lungs: Clear to auscultation bilaterally, good air entry, non-labored breathing  Cardio: RRR, S1/S2, No murmur  Abdomen: Soft, Nontender, Nondistended; Bowel sounds present  Extremities: No calf tenderness, No cyanosis, No pitting edema  Psych: Appropriate mood and affect  Neuro: left facial droop noted, patient has full ROM in all extremities. sensation intact. Ambulated hallway with PT. Patient still has slight slurred speech     LABS:                        16.1   8.59  )-----------( 184      ( 12 Sep 2024 07:20 )             47.3     09-12    136  |  101  |  20  ----------------------------<  104  4.2   |  30  |  1.14    Ca    8.3      12 Sep 2024 07:20    TPro  6.2  /  Alb  3.4  /  TBili  0.6  /  DBili  x   /  AST  26  /  ALT  25  /  AlkPhos  97  09-12    PT/INR - ( 11 Sep 2024 13:20 )   PT: 10.8 sec;   INR: 0.95 ratio    PTT - ( 11 Sep 2024 13:20 )  PTT:38.1 sec      CARDIAC MARKERS ( 11 Sep 2024 13:20 )  x     / 4.3 ng/L / x     / x     / x        OCT Blood Glucose.: 95 mg/dL (11 Sep 2024 13:10)      Urinalysis Basic - ( 12 Sep 2024 07:20 )    Color: x / Appearance: x / SG: x / pH: x  Gluc: 104 mg/dL / Ketone: x  / Bili: x / Urobili: x   Blood: x / Protein: x / Nitrite: x   Leuk Esterase: x / RBC: x / WBC x   Sq Epi: x / Non Sq Epi: x / Bacteria: x

## 2024-09-12 NOTE — DISCHARGE NOTE PROVIDER - PROVIDER TOKENS
FREE:[LAST:[Jose],FIRST:[],PHONE:[(   )    -],FAX:[(   )    -]] FREE:[LAST:[Jose],FIRST:[],PHONE:[(   )    -],FAX:[(   )    -]],PROVIDER:[TOKEN:[[id number]]],PROVIDER:[TOKEN:[[id number]]]

## 2024-09-12 NOTE — DISCHARGE NOTE PROVIDER - NSDCMRMEDTOKEN_GEN_ALL_CORE_FT
KlonoPIN 1 mg oral tablet: 1 tab(s) orally 2 times a day  methylphenidate 10 mg oral tablet: 1 tab(s) orally 2 times a day  Nardil 15 mg oral tablet: 1 tab(s) orally 4 times a day   aspirin 81 mg oral delayed release tablet: 1 tab(s) orally once a day  atorvastatin 80 mg oral tablet: 1 tab(s) orally once a day (at bedtime)  clopidogrel 75 mg oral tablet: 1 tab(s) orally once a day please continue for 3 weeks  KlonoPIN 1 mg oral tablet: 1 tab(s) orally 2 times a day  methylphenidate 10 mg oral tablet: 1 tab(s) orally 2 times a day  Nardil 15 mg oral tablet: 1 tab(s) orally 4 times a day  Speech Therapy: evaluate and treat

## 2024-09-12 NOTE — DIETITIAN INITIAL EVALUATION ADULT - OTHER INFO
59y year old Male with PMH of ADHD, Depression, Active Smoker who presents to the ER complaining of slurred speech, left facial weakness since 11pm last night. Patient admitted dysarthria, CT (-) for stroke , MRI pending , Appetite reported good consumed 100% of breakfast meal, NKFA , no changes in weight reported , last BM (9/11) Skin intact, no edema , UBW reported ~ 208lbs

## 2024-09-12 NOTE — OCCUPATIONAL THERAPY INITIAL EVALUATION ADULT - EATING, PREVIOUS LEVEL OF FUNCTION, OT EVAL
----- Message from Lisa Pedro MA sent at 4/24/2023  9:50 AM CDT -----  PATIENT MOM MIRYAM CALLED FROM [phone] CALLED IN REFERENCE TO APPT. THURSDAY     independent

## 2024-09-12 NOTE — DISCHARGE NOTE PROVIDER - HOSPITAL COURSE
Hospital Course  HPI:  JENNIFER EDWARD is a 59y year old Male with PMH of ADHD, Depression, Active Smoker who presents to the ER complaining of slurred speech, left facial weakness since 11pm last night.     Patient states symptoms started suddenly yesterday evening, he went to sleep but did not resolve upon awakening. He went to work when his coworkers urged him to come to ER. Denies weakness of arms/legs. Does endorse heaviness of his face.     On ER arrival: /90; ; RR 18; T 98.3; sat 96% on room air  Code stroke CT imaging showing no hemorrhage, no proximal large vessel occlusion.  (11 Sep 2024 17:18)      You were admitted for   You were diagnosed with   You were treated with   You were prescribed the following new medications:    You will need to follow up with your primary care physician.    Source of Infection:  Antibiotic / Last Day:    Palliative Care / Advanced Care Planning  Code Status:  Patient/Family agreeable to Hospice/Palliative (Y/N)?  Summary of Goals of Care Conversation:    Discharging Provider:  Kimo Wyatt NP  Contact Info: Cell [phone] - Please call with any questions or concerns.    Outpatient Provider: PCP - Dr. Garcia    Signout given to  SNF Provider:   Hospital Course    JENNIFER EDWARD is a 59y year old Male with PMH of ADHD, Depression, Active Smoker who presents to the ER complaining of slurred speech, left facial weakness since 11pm last night.     Patient states symptoms started suddenly yesterday evening, he went to sleep but did not resolve upon awakening. He went to work when his coworkers urged him to come to ER. Denies weakness of arms/legs. Does endorse heaviness of his face.     Code stroke CT imaging showing no hemorrhage, no proximal large vessel occlusion.  (11 Sep 2024 17:18)    < from: MR Head No Cont (09.12.24 @ 13:09) >    Parenchymal volume loss and chronic microvessel ischemic changes are   again seen.    Abnormal T2 prolongation with restricted diffusion is seen involving the   right corona radiata/centrum semiovale region , this is compatible with   an acute infarct. No hemorrhagic transformation is seen. No significant   shift or herniation is seen    The large vessels demonstrate normal flow voids    The visualized paranasal sinuses mastoid and middle ear are clear.    Impression: Acute infarct as described above.    < end of copied text >    You were seen by neurology in house. Please continue with plavix/aspirin for 3 weeks, then take aspirin alone. Plan to follow up with outpatient neurology.   You were seen by speech therapy and given a script for outpatient speech therapy. Plan to follow up.       You will need to follow up with your primary care physician.        Discharging Provider:  Kimo Wyatt NP  Contact Info: Cell [phone] - Please call with any questions or concerns.    Outpatient Provider: PCP - Dr. Garcia     Hospital Course    JENNIFER EDWARD is a 59y year old Male with PMH of ADHD, Depression, Active Smoker who presents to the ER complaining of slurred speech, left facial weakness since 11pm last night.     Patient states symptoms started suddenly yesterday evening, he went to sleep but did not resolve upon awakening. He went to work when his coworkers urged him to come to ER. Denies weakness of arms/legs. Does endorse heaviness of his face.     Code stroke CT imaging showing no hemorrhage, no proximal large vessel occlusion.  (11 Sep 2024 17:18)    < from: MR Head No Cont (09.12.24 @ 13:09) >    Parenchymal volume loss and chronic microvessel ischemic changes are   again seen.    Abnormal T2 prolongation with restricted diffusion is seen involving the   right corona radiata/centrum semiovale region , this is compatible with   an acute infarct. No hemorrhagic transformation is seen. No significant   shift or herniation is seen    The large vessels demonstrate normal flow voids    The visualized paranasal sinuses mastoid and middle ear are clear.    Impression: Acute infarct as described above.    < end of copied text >    You were seen by neurology in house. Please continue with plavix/aspirin for 3 weeks, then take aspirin alone. Plan to follow up with outpatient neurology.   You were seen by speech therapy and given a script for outpatient speech therapy. Plan to follow up.   Plan to follow up with cardiology outpatient extended cardiac monitoring with external cardiac monitor or ILR.       You will need to follow up with your primary care physician.        Discharging Provider:  Kimo Wyatt NP  Contact Info: Cell [phone] - Please call with any questions or concerns.    Outpatient Provider: PCP - Dr. Garcia     Hospital Course    JENNIFER EDWARD is a 59y year old Male with PMH of ADHD, Depression, Active Smoker who presents to the ER complaining of slurred speech, left facial weakness since 11pm last night.     Patient states symptoms started suddenly yesterday evening, he went to sleep but did not resolve upon awakening. He went to work when his coworkers urged him to come to ER. Denies weakness of arms/legs. Does endorse heaviness of his face.     Code stroke CT imaging showing no hemorrhage, no proximal large vessel occlusion.  (11 Sep 2024 17:18)    MR Head No Cont    Parenchymal volume loss and chronic microvessel ischemic changes are   again seen.    Abnormal T2 prolongation with restricted diffusion is seen involving the   right corona radiata/centrum semiovale region , this is compatible with   an acute infarct. No hemorrhagic transformation is seen. No significant   shift or herniation is seen    The large vessels demonstrate normal flow voids    The visualized paranasal sinuses mastoid and middle ear are clear.    Impression: Acute infarct as described above.    You were seen by neurology in house. Please continue with plavix/aspirin for 3 weeks, then take aspirin alone. Plan to follow up with outpatient neurology.   You were seen by speech therapy and given a script for outpatient speech therapy. Plan to follow up.   Plan to follow up with cardiology outpatient extended cardiac monitoring with external cardiac monitor or ILR.       You will need to follow up with your primary care physician.        Discharging Provider:  Kimo Wyatt NP  Contact Info: Cell [phone] - Please call with any questions or concerns.    Outpatient Provider: PCP - Dr. Garcia

## 2024-09-12 NOTE — PHYSICAL THERAPY INITIAL EVALUATION ADULT - PERTINENT HX OF CURRENT PROBLEM, REHAB EVAL
DAYNA EDWARD is a 59y year old Male with PMH of ADHD, Depression, Active Smoker who presents to the ER complaining of slurred speech, left facial weakness since 11pm last night.   Patient states symptoms started suddenly yesterday evening, he went to sleep but did not resolve upon awakening. He went to work when his coworkers urged him to come to ER. Denies weakness of arms/legs. Does endorse heaviness of his face.

## 2024-09-12 NOTE — PHYSICAL THERAPY INITIAL EVALUATION ADULT - ADDITIONAL COMMENTS
pt lives alone in apt, 1 flt to br. pt is independent w/ambulation and ADL's, +, works full-time as club mgr

## 2024-09-12 NOTE — PROGRESS NOTE ADULT - SUBJECTIVE AND OBJECTIVE BOX
Patient is a 59 year old man admitted 9/11/24. He states last evening at 11 PM he noted that his speech was slurred and left facial weakness. He went to sleep and woke up and still noted no change. Today, he continued with the left facial weakness and slurred speech and came to the ED. He denies HA or other associated neurological complaints. He denies fever, chest pain, trauma. Today, Thursday, 9/12/24 he states no change with slurred speech and left facial weakness. He denies HA or other neurological complaints.    PMH: As above          Depression          ADHD    SH:  Allergy-epinephrine    Exam: Awake,alert, appropriate            Speech-dysarthric           Pupils 2.5mm, EOM intact, no nystagmus, VFF          CN II-XII intact except Left VII, delay in smiling on left, flattening of left NLF          Motor tone and strength  RUE 5/5     LUE  5/5                                                RLE  5/5    LLE   5/5                          16.2   11.29 )-----------( 221      ( 11 Sep 2024 13:20 )             47.1     09-11    138  |  101  |  19  ----------------------------<  90  4.2   |  31  |  1.18    Ca    8.8      11 Sep 2024 13:20    TPro  6.8  /  Alb  3.9  /  TBili  0.6  /  DBili  x   /  AST  26  /  ALT  25  /  AlkPhos  97  09-11        < from: TTE Echo Complete w/o Contrast w/ Doppler (09.12.24 @ 08:15) >    Summary:   1. Left ventricular ejection fraction, by visual estimation, is 65 to   70%.   2. Normal global left ventricular systolic function.   3. Normal left ventricular internal cavity size.   4. Spectral Doppler shows impaired relaxation pattern of left   ventricular myocardial filling (Grade I diastolic dysfunction).   5. There is mild concentric left ventricular hypertrophy.   6.Mild mitral annular calcification.   7. Mild thickening of the posterior mitral valve leaflet.    [id number] Deion Gardner , Electronically signed on 9/12/2024   at 2:53:20 PM          < end of copied text >          < from: MR Head No Cont (09.12.24 @ 13:09) >    This exam is compared prior head CT performed on September 11, 2024.    Parenchymal volume loss and chronic microvessel ischemic changes are   again seen.    Abnormal T2 prolongation with restricted diffusion is seen involving the   right corona radiata/centrum semiovale region , this is compatible with   an acute infarct. No hemorrhagic transformation is seen. No significant   shift or herniation is seen    The large vessels demonstrate normal flow voids    The visualized paranasal sinuses mastoid and middle ear are clear.    Impression: Acute infarct as described above.    --- End of Report ---            CHENG KAHN MD; Attending Radiologist  This document has been electronically signed. Sep 12 2024  1:17PM    < end of copied text >                < from: CT Brain Stroke Protocol (09.11.24 @ 13:28) >    ACC: [id number] EXAM:  CT ANGIO BRAIN STROKE PROTC IC   ORDERED BY: RAMON LOEWRY     ACC: [id number] EXAM:  CT ANGIO NECK STROKE PROTCL IC   ORDERED BY: RAMON LOWERY     ACC: [id number] EXAM:  CT BRAIN STROKE PROTOCOL   ORDERED BY: RAMON LOWERY     ACC: [id number] EXAM:  CT BRAIN PERFUSION MAPS STROKE   ORDERED BY: RAMON LOWERY     PROCEDURE DATE:  09/11/2024          INTERPRETATION:  CT HEAD STROKE PROTOCOL, CT PERFUSION WITH MAPS STROKE   PROTOCOL, CT ANGIO NECK STROKE PROTOCOL, CT ANGIO HEAD STROKE PROTOCOL    CLINICAL HISTORY: Stroke Code. Slurred speech. Left-sided facial droop    CT HEAD TECHNIQUE:  Noncontrast CT.  Axial acquisition.  Sagittal and coronal reformations.    COMPARISON:  Prior head CT of 8/8/2012    FINDINGS:  HEMORRHAGE: No evidence of intracranial hemorrhage.  BRAIN PARENCHYMA:  No abnormal regions of parenchymal attenuation.  No   mass or mass effect.  VENTRICLES / SHIFT:  No hydrocephalus. No midline shift.  EXTRA-AXIAL / BASAL CISTERNS:  No extra-axial mass. Basal cisterns   preserved.  CALVARIUM AND EXTRACRANIAL SOFT TISSUES:  No depressed calvarial fracture.  SINUSES, ORBITS, MASTOIDS:  Mild regions of mucosal thickening within the   paranasal sinuses. Visualized mastoids and middle ear are well-aerated.  ----------------------------------------    CTA TECHNIQUE:  CT angiography of the neck and brain was performed during the dynamic   administration of intravenous contrast.  MIP reconstructions were   performed and reviewed. Multiplanar reformations were obtained.  Contrast administered: 75 cc Omipaque 350.  Contrast discarded: 0 cc.    CTA FINDINGS:  NECK  RIGHT CAROTID CIRCULATION:  Evaluation of the right carotid circulation demonstrates no hemodynamic   significant narrowing utilizing a distal reference.  LEFT CAROTID CIRCULATION:  Evaluation of the left carotid circulation demonstrates no hemodynamic   significant narrowing utilizing a distal reference.  VERTEBRAL ARTERIES:  Evaluation of the vertebral arteries reveals no evidence of a vertebral   artery occlusion or dissection.    BRAIN  ANTERIOR CIRCULATION:  Distal internal carotid arteries are patent.  Anterior cerebral arteries are patent.  Middle cerebral arteries are patent.  POSTERIOR CIRCULATION:  Distal vertebral arteries arepatent.  A right posterior inferior   cerebellar artery is seen  Basilar artery is patent.  Proximal superior cerebellar arteries are   patent  Posterior cerebral arteries are patent.    OTHER:  Gross patency of the large dural sinuses.  --------------------------------------------------    CT PERFUSION TECHNIQUE:  CT perfusion was performed during the administration of intravenous   contrast.  There was a total of 8 - 10 cm brain coverage.  The images were processed utilizing RAPID software.  Contrast administered: 75 cc Omipaque 350.  Contrast discarded: 0 cc.    Ischemic tissue is defined as TMAX > 6 seconds.  Core infarct is defined as CBF < 30%.    CT PERFUSION FINDINGS:  Perfusion imaging failed due to technical issues      IMPRESSION:  HEAD CT: No evidence of an acute intracranial hemorhage, midline shift or   hydrocephalus.  NECK CTA: No hemodynamic significant narowing within the neck.  BRAIN CTA: No proximal large vessel occlusion.  CT PERFUSION: Failed due to technical issues.    Preliminary head CT findings discussed with Dr LOWERY at 1:25 PM on   9/11/2024    --- End of Report ---            VALENTIN INGRAM MD; Attending Radiologist  This document has been electronically signed. Sep 11 2024  1:37PM    < end of copied text >

## 2024-09-13 ENCOUNTER — TRANSCRIPTION ENCOUNTER (OUTPATIENT)
Age: 59
End: 2024-09-13

## 2024-09-13 ENCOUNTER — NON-APPOINTMENT (OUTPATIENT)
Age: 59
End: 2024-09-13

## 2024-09-13 VITALS
OXYGEN SATURATION: 99 % | SYSTOLIC BLOOD PRESSURE: 135 MMHG | DIASTOLIC BLOOD PRESSURE: 85 MMHG | RESPIRATION RATE: 18 BRPM | HEART RATE: 84 BPM

## 2024-09-13 LAB
CHOLEST SERPL-MCNC: 182 MG/DL — SIGNIFICANT CHANGE UP
HDLC SERPL-MCNC: 36 MG/DL — LOW
LIPID PNL WITH DIRECT LDL SERPL: 122 MG/DL — HIGH
NON HDL CHOLESTEROL: 145 MG/DL — HIGH
TRIGL SERPL-MCNC: 127 MG/DL — SIGNIFICANT CHANGE UP

## 2024-09-13 PROCEDURE — 70496 CT ANGIOGRAPHY HEAD: CPT | Mod: MC

## 2024-09-13 PROCEDURE — 85730 THROMBOPLASTIN TIME PARTIAL: CPT

## 2024-09-13 PROCEDURE — 83036 HEMOGLOBIN GLYCOSYLATED A1C: CPT

## 2024-09-13 PROCEDURE — 36415 COLL VENOUS BLD VENIPUNCTURE: CPT

## 2024-09-13 PROCEDURE — 99233 SBSQ HOSP IP/OBS HIGH 50: CPT

## 2024-09-13 PROCEDURE — 70551 MRI BRAIN STEM W/O DYE: CPT | Mod: MC

## 2024-09-13 PROCEDURE — 80061 LIPID PANEL: CPT

## 2024-09-13 PROCEDURE — 85027 COMPLETE CBC AUTOMATED: CPT

## 2024-09-13 PROCEDURE — 84484 ASSAY OF TROPONIN QUANT: CPT

## 2024-09-13 PROCEDURE — 71045 X-RAY EXAM CHEST 1 VIEW: CPT

## 2024-09-13 PROCEDURE — 93306 TTE W/DOPPLER COMPLETE: CPT

## 2024-09-13 PROCEDURE — 85610 PROTHROMBIN TIME: CPT

## 2024-09-13 PROCEDURE — 93005 ELECTROCARDIOGRAM TRACING: CPT

## 2024-09-13 PROCEDURE — 85025 COMPLETE CBC W/AUTO DIFF WBC: CPT

## 2024-09-13 PROCEDURE — 99285 EMERGENCY DEPT VISIT HI MDM: CPT

## 2024-09-13 PROCEDURE — 92523 SPEECH SOUND LANG COMPREHEN: CPT

## 2024-09-13 PROCEDURE — 70498 CT ANGIOGRAPHY NECK: CPT | Mod: MC

## 2024-09-13 PROCEDURE — 97161 PT EVAL LOW COMPLEX 20 MIN: CPT

## 2024-09-13 PROCEDURE — 97165 OT EVAL LOW COMPLEX 30 MIN: CPT

## 2024-09-13 PROCEDURE — 0042T: CPT | Mod: MC

## 2024-09-13 PROCEDURE — 82962 GLUCOSE BLOOD TEST: CPT

## 2024-09-13 PROCEDURE — 80307 DRUG TEST PRSMV CHEM ANLYZR: CPT

## 2024-09-13 PROCEDURE — 70450 CT HEAD/BRAIN W/O DYE: CPT | Mod: MC

## 2024-09-13 PROCEDURE — 80053 COMPREHEN METABOLIC PANEL: CPT

## 2024-09-13 RX ORDER — ASPIRIN 81 MG
1 TABLET, DELAYED RELEASE (ENTERIC COATED) ORAL
Qty: 30 | Refills: 0
Start: 2024-09-13 | End: 2024-10-12

## 2024-09-13 RX ADMIN — Medication 81 MILLIGRAM(S): at 11:32

## 2024-09-13 RX ADMIN — Medication 75 MILLIGRAM(S): at 11:33

## 2024-09-13 RX ADMIN — ACETAMINOPHEN 650 MILLIGRAM(S): 325 TABLET ORAL at 11:33

## 2024-09-13 RX ADMIN — PHENELZINE SULFATE 15 MILLIGRAM(S): 15 TABLET, FILM COATED ORAL at 13:06

## 2024-09-13 RX ADMIN — ACETAMINOPHEN 650 MILLIGRAM(S): 325 TABLET ORAL at 12:30

## 2024-09-13 NOTE — PROGRESS NOTE ADULT - ASSESSMENT
59y year old Male with PMH of ADHD, Depression, Active Smoker who presents to the ER complaining of slurred speech, left facial weakness.     #Acute CVA  - ASA 81 daily, plavix 75mg daily (for 3 weeks then dc), lipitor 80mg qhs  - telemetry monitoring to r/o arrhythmia   - TTE - Left ventricular ejection fraction, by visual estimation, is 65 to 70%.  - MR head - Abnormal T2 prolongation with restricted diffusion is seen involving the right corona radiata/centrum semiovale region, this is compatible with an acute infarct.   - DASH diet   - Lipid profile reviewed  - A1C - 5.4   - PT/OT consult - independent   - Neurology - ASA 81 mg and Plavix 75 mg daily for 3 weeks after 3 weeks, discontinue Plavix and continue ASA alone    #active Smoker   - offered nicotine patches, patient declined  - counseled on smoking cessation     #Depression  - continue home nardil, klonopin     #ADHD   - continue home ritalin     #DVT Prophylaxis:  - Lovenox     GOC full code     Dispo: discharge planning, Cardiology consult outpatient for extended cardiac monitoring with external cardiac monitor or ILR    9/12 Patient aware and in agreement with plan of care      59y year old Male with PMH of ADHD, Depression, Active Smoker who presents to the ER complaining of slurred speech, left facial weakness.     #Acute CVA  - ASA 81 daily, plavix 75mg daily (for 3 weeks then dc), lipitor 80mg qhs  - TTE - Left ventricular ejection fraction, by visual estimation, is 65 to 70%.  - MR head - Abnormal T2 prolongation with restricted diffusion is seen involving the right corona radiata/centrum semiovale region, this is compatible with an acute infarct.   - DASH diet   - Lipid profile reviewed  - A1C - 5.4   - PT/OT consult - independent   - Neurology - ASA 81 mg and Plavix 75 mg daily for 3 weeks after 3 weeks, discontinue Plavix and continue ASA alone    #active Smoker   - offered nicotine patches, patient declined  - counseled on smoking cessation     #Depression  - continue home nardil, klonopin     #ADHD   - continue home ritalin     #DVT Prophylaxis:  - Lovenox     GOC full code     Dispo: discharge planning, Cardiology consult outpatient for extended cardiac monitoring with external cardiac monitor or ILR    9/13 Patient aware and in agreement with plan of care      59y year old Male with PMH of ADHD, Depression, Active Smoker who presents to the ER complaining of slurred speech, left facial weakness.     #Acute CVA  - ASA 81 daily, plavix 75mg daily (for 3 weeks then dc), lipitor 80mg qhs  - TTE - Left ventricular ejection fraction, by visual estimation, is 65 to 70%.  - MR head - Abnormal T2 prolongation with restricted diffusion is seen involving the right corona radiata/centrum semiovale region, this is compatible with an acute infarct.   - DASH diet   - Lipid profile reviewed  - A1C - 5.4   - PT/OT consult - independent; Rx given for speech therapy  - Neurology - ASA 81 mg and Plavix 75 mg daily for 3 weeks after 3 weeks, discontinue Plavix and continue ASA alone  - Will refer to cardiology outpatient for holter/ILR placement    #active Smoker   - offered nicotine patches, patient declined  - counseled on smoking cessation     #Depression  - continue home nardil, klonopin     #ADHD   - continue home ritalin     Stable for discharge home with home care  F/U neurology, cardiology, PCP    9/13 Patient aware and in agreement with plan of care

## 2024-09-13 NOTE — SPEECH LANGUAGE PATHOLOGY EVALUATION - COMMENTS
IMPRESSION:  HEAD CT: No evidence of an acute intracranial hemorhage, midline shift or   hydrocephalus.  NECK CTA: No hemodynamic significant narowing within the neck.  BRAIN CTA: No proximal large vessel occlusion.  CT PERFUSION: Failed due to technical issues.  MRI Brain without contrast on 9/12 found abnormal T2 prolongation with restricted diffusion is seen involving the right corona radiata/centrum semiovale region, this is compatible with an acute infarct. No hemorrhagic transformation is seen. No significant shift or herniation is seen. CXR from 9/11 found no acute cardiopulmonary disease process. WBC 11.29 on 9/11. IMPRESSION:  HEAD CT: No evidence of an acute intracranial hemorrhage, midline shift or   hydrocephalus.  NECK CTA: No hemodynamic significant narowing within the neck.  BRAIN CTA: No proximal large vessel occlusion.  CT PERFUSION: Failed due to technical issues.  MRI Brain without contrast on 9/12 found abnormal T2 prolongation with restricted diffusion is seen involving the right corona radiata/centrum semiovale region, this is compatible with an acute infarct. No hemorrhagic transformation is seen. No significant shift or herniation is seen. CXR from 9/11 found no acute cardiopulmonary disease process. WBC 11.29 on 9/11.

## 2024-09-13 NOTE — SPEECH LANGUAGE PATHOLOGY EVALUATION - SLP DIAGNOSIS
Patient was received at bedside alert and oriented x4, pleasant and cooperative, with good insight regarding deficits and agreeable to evaluation of speech and language. Patient noted with a slight facial asymmetry on the left side. Brief speech and language screener performed and patient currently present with a mild to moderate dysarthria of speech characterized by decreased articulatory precision and decreased volume. Patient also states friends and family report a decline in his intelligibility. Patient is recommended outpatient speech therapy services upon discharge from hospital. Patient was received at bedside alert and oriented x4, pleasant and cooperative, with good insight regarding deficits and agreeable to evaluation of speech and language. Patient noted with a slight facial asymmetry on the left side. Brief speech and language screener performed and patient currently presents with a mild to moderate dysarthria of speech characterized by decreased articulatory precision and decreased volume. Patient also states friends and family report a decline in his intelligibility. Receptive and expressive language skills are GWFL. Patient is recommended outpatient speech therapy services upon discharge from hospital.

## 2024-09-13 NOTE — PROGRESS NOTE ADULT - NS ATTEND AMEND GEN_ALL_CORE FT
Possible acute CVA with speech deficits    C/w stroke protocol  Will f/u neurology  MRI brain pending  Will c/w ASA +plavix x 3 weeks, then only ASA  C/w lipitor  DVT PPX  PT consult done    Possible d/c home in next 24 hrs
Acute CVA    Stable for discharge home with home care  F/U neurology, cardiology, PCP  For outpatient holer vs ILR  C/w ASA, plavix x 3 weeks  Smoking cessation counseling done  Speech therapy Rx given    D/c 45min

## 2024-09-13 NOTE — DISCHARGE NOTE NURSING/CASE MANAGEMENT/SOCIAL WORK - NSDCFUADDAPPT_GEN_ALL_CORE_FT
APPTS ARE READY TO BE MADE: [X] YES    Best Family or Patient Contact (if needed):    Additional Information about above appointments (if needed):    1: Neurology   2: Cardiology   3:     Other comments or requests:    APPTS ARE READY TO BE MADE: [X] YES    Best Family or Patient Contact (if needed):    Additional Information about above appointments (if needed):    1: Neurology   2: Cardiology   3:     Transitions of [place] [facility] on 09/17/24 at 9Am. Address: [address] [place] [address], [address] [place] [address].   Patient will make follow up appointment with Dr. You Berrios within one week.    Other comments or requests:

## 2024-09-13 NOTE — DISCHARGE NOTE NURSING/CASE MANAGEMENT/SOCIAL WORK - PATIENT PORTAL LINK FT
You can access the FollowMyHealth Patient Portal offered by [facility] by registering at the following website: [email or web address]. By joining [employer]’s FollowMyHealth portal, you will also be able to view your health information using other applications (apps) compatible with our system.

## 2024-09-13 NOTE — SPEECH LANGUAGE PATHOLOGY EVALUATION - SLP CRITERIA FOR SKILLED THERAPEUTIC INTERVENTIONS MET
skilled criteria for intervention met Patient would benefit from speech therapy to implement compensatory speech strategies and EMST to improve breath support for speech./skilled criteria for intervention met

## 2024-09-13 NOTE — PROGRESS NOTE ADULT - SUBJECTIVE AND OBJECTIVE BOX
Patient is a 59y old  Male who presents with a chief complaint of dysarthria (13 Sep 2024 07:40)      Patient seen and examined at bedside. No overnight events reported.     ALLERGIES:  EPINEPHrine Auto-Injector (Other)    MEDICATIONS  (STANDING):  aspirin enteric coated 81 milliGRAM(s) Oral daily  atorvastatin 80 milliGRAM(s) Oral at bedtime  clonazePAM  Tablet 1 milliGRAM(s) Oral two times a day  clopidogrel Tablet 75 milliGRAM(s) Oral daily  enoxaparin Injectable 40 milliGRAM(s) SubCutaneous every 24 hours  methylphenidate 10 milliGRAM(s) Oral two times a day  phenelzine 15 milliGRAM(s) Oral four times a day    MEDICATIONS  (PRN):  acetaminophen     Tablet .. 650 milliGRAM(s) Oral every 6 hours PRN Temp greater or equal to 38C (100.4F), Mild Pain (1 - 3)  aluminum hydroxide/magnesium hydroxide/simethicone Suspension 30 milliLiter(s) Oral every 4 hours PRN Dyspepsia  melatonin 3 milliGRAM(s) Oral at bedtime PRN Insomnia  ondansetron Injectable 4 milliGRAM(s) IV Push every 8 hours PRN Nausea and/or Vomiting    Vital Signs Last 24 Hrs  T(F): 97.7 (13 Sep 2024 05:10), Max: 98.7 (12 Sep 2024 21:43)  HR: 78 (13 Sep 2024 05:10) (78 - 84)  BP: 130/86 (13 Sep 2024 05:10) (130/86 - 150/90)  RR: 18 (13 Sep 2024 05:10) (18 - 18)  SpO2: 94% (13 Sep 2024 05:10) (91% - 94%)  I&O's Summary    PHYSICAL EXAM:  General: NAD, A/O x 3  ENT: No gross hearing impairment, Moist mucous membranes, no thrush  Neck: Supple, No JVD  Lungs: Clear to auscultation bilaterally, good air entry, non-labored breathing  Cardio: RRR, S1/S2, No murmur  Abdomen: Soft, Nontender, Nondistended; Bowel sounds present  Extremities: No calf tenderness, No cyanosis, No pitting edema  Psych: Appropriate mood and affect  Neuro: left sided facial droop, mild dysarthria. Patient has full strength and mobility in all extremities     LABS:                        16.1   8.59  )-----------( 184      ( 12 Sep 2024 07:20 )             47.3     09-12    136  |  101  |  20  ----------------------------<  104  4.2   |  30  |  1.14    Ca    8.3      12 Sep 2024 07:20    TPro  6.2  /  Alb  3.4  /  TBili  0.6  /  DBili  x   /  AST  26  /  ALT  25  /  AlkPhos  97  09-12          PT/INR - ( 11 Sep 2024 13:20 )   PT: 10.8 sec;   INR: 0.95 ratio         PTT - ( 11 Sep 2024 13:20 )  PTT:38.1 sec      CARDIAC MARKERS ( 11 Sep 2024 13:20 )  x     / 4.3 ng/L / x     / x     / x          09-12 Chol 182 mg/dL LDL -- HDL 36 mg/dL Trig 127 mg/dL                  Urinalysis Basic - ( 12 Sep 2024 07:20 )    Color: x / Appearance: x / SG: x / pH: x  Gluc: 104 mg/dL / Ketone: x  / Bili: x / Urobili: x   Blood: x / Protein: x / Nitrite: x   Leuk Esterase: x / RBC: x / WBC x   Sq Epi: x / Non Sq Epi: x / Bacteria: x          RADIOLOGY & ADDITIONAL TESTS:    Care Discussed with Consultants/Other Providers:    Patient is a 59y old  Male who presents with a chief complaint of dysarthria (13 Sep 2024 07:40)      Patient seen and examined at bedside. No overnight events reported.     ALLERGIES:  EPINEPHrine Auto-Injector (Other)    MEDICATIONS  (STANDING):  aspirin enteric coated 81 milliGRAM(s) Oral daily  atorvastatin 80 milliGRAM(s) Oral at bedtime  clonazePAM  Tablet 1 milliGRAM(s) Oral two times a day  clopidogrel Tablet 75 milliGRAM(s) Oral daily  enoxaparin Injectable 40 milliGRAM(s) SubCutaneous every 24 hours  methylphenidate 10 milliGRAM(s) Oral two times a day  phenelzine 15 milliGRAM(s) Oral four times a day    MEDICATIONS  (PRN):  acetaminophen     Tablet .. 650 milliGRAM(s) Oral every 6 hours PRN Temp greater or equal to 38C (100.4F), Mild Pain (1 - 3)  aluminum hydroxide/magnesium hydroxide/simethicone Suspension 30 milliLiter(s) Oral every 4 hours PRN Dyspepsia  melatonin 3 milliGRAM(s) Oral at bedtime PRN Insomnia  ondansetron Injectable 4 milliGRAM(s) IV Push every 8 hours PRN Nausea and/or Vomiting    Vital Signs Last 24 Hrs  T(F): 97.7 (13 Sep 2024 05:10), Max: 98.7 (12 Sep 2024 21:43)  HR: 78 (13 Sep 2024 05:10) (78 - 84)  BP: 130/86 (13 Sep 2024 05:10) (130/86 - 150/90)  RR: 18 (13 Sep 2024 05:10) (18 - 18)  SpO2: 94% (13 Sep 2024 05:10) (91% - 94%)  I&O's Summary    PHYSICAL EXAM:  General: NAD, A/O x 3  ENT: No gross hearing impairment, Moist mucous membranes, no thrush  Neck: Supple, No JVD  Lungs: Clear to auscultation bilaterally, good air entry, non-labored breathing  Cardio: RRR, S1/S2, No murmur  Abdomen: Soft, Nontender, Nondistended; Bowel sounds present  Extremities: No calf tenderness, No cyanosis, No pitting edema  Psych: Appropriate mood and affect  Neuro: left sided facial droop, mild dysarthria. Patient has full strength and mobility in all extremities     LABS:                        16.1   8.59  )-----------( 184      ( 12 Sep 2024 07:20 )             47.3     09-12    136  |  101  |  20  ----------------------------<  104  4.2   |  30  |  1.14    Ca    8.3      12 Sep 2024 07:20    TPro  6.2  /  Alb  3.4  /  TBili  0.6  /  DBili  x   /  AST  26  /  ALT  25  /  AlkPhos  97  09-12    PT/INR - ( 11 Sep 2024 13:20 )   PT: 10.8 sec;   INR: 0.95 ratio    PTT - ( 11 Sep 2024 13:20 )  PTT:38.1 sec    CARDIAC MARKERS ( 11 Sep 2024 13:20 )  x     / 4.3 ng/L / x     / x     / x        09-12 Chol 182 mg/dL LDL -- HDL 36 mg/dL Trig 127 mg/dL    nalysis Basic - ( 12 Sep 2024 07:20 )    Color: x / Appearance: x / SG: x / pH: x  Gluc: 104 mg/dL / Ketone: x  / Bili: x / Urobili: x   Blood: x / Protein: x / Nitrite: x   Leuk Esterase: x / RBC: x / WBC x   Sq Epi: x / Non Sq Epi: x / Bacteria: x

## 2024-09-13 NOTE — SPEECH LANGUAGE PATHOLOGY EVALUATION - SLP GENERAL OBSERVATIONS
Received upright in bed, alert in no apparent distress, pleasant cooperative, good insight regarding admission to hospital for CVA, agreeable to evaluation of speech and language

## 2024-09-13 NOTE — SPEECH LANGUAGE PATHOLOGY EVALUATION - SLP PERTINENT HISTORY OF CURRENT PROBLEM
JENNIFER EDWARD is a 59y year old Male with PMH of ADHD, Depression, Active Smoker who presents to the ER complaining of slurred speech, left facial weakness since 11pm last night.

## 2024-09-16 ENCOUNTER — APPOINTMENT (OUTPATIENT)
Dept: CARDIOLOGY | Facility: CLINIC | Age: 59
End: 2024-09-16
Payer: COMMERCIAL

## 2024-09-16 ENCOUNTER — NON-APPOINTMENT (OUTPATIENT)
Age: 59
End: 2024-09-16

## 2024-09-16 ENCOUNTER — APPOINTMENT (OUTPATIENT)
Dept: CARDIOLOGY | Facility: CLINIC | Age: 59
End: 2024-09-16

## 2024-09-16 VITALS
HEART RATE: 97 BPM | DIASTOLIC BLOOD PRESSURE: 96 MMHG | SYSTOLIC BLOOD PRESSURE: 153 MMHG | RESPIRATION RATE: 20 BRPM | WEIGHT: 212 LBS | HEIGHT: 71 IN | BODY MASS INDEX: 29.68 KG/M2 | OXYGEN SATURATION: 94 %

## 2024-09-16 DIAGNOSIS — I10 ESSENTIAL (PRIMARY) HYPERTENSION: ICD-10-CM

## 2024-09-16 DIAGNOSIS — E78.5 HYPERLIPIDEMIA, UNSPECIFIED: ICD-10-CM

## 2024-09-16 PROBLEM — F90.9 ATTENTION-DEFICIT HYPERACTIVITY DISORDER, UNSPECIFIED TYPE: Chronic | Status: ACTIVE | Noted: 2024-09-11

## 2024-09-16 PROCEDURE — 99205 OFFICE O/P NEW HI 60 MIN: CPT | Mod: 25

## 2024-09-16 PROCEDURE — 93000 ELECTROCARDIOGRAM COMPLETE: CPT

## 2024-09-16 PROCEDURE — 93246 EXT ECG>7D<15D RECORDING: CPT

## 2024-09-16 RX ORDER — CLOPIDOGREL BISULFATE 75 MG/1
75 TABLET, FILM COATED ORAL
Refills: 0 | Status: ACTIVE | COMMUNITY

## 2024-09-16 RX ORDER — ASPIRIN 81 MG
81 TABLET, DELAYED RELEASE (ENTERIC COATED) ORAL
Refills: 0 | Status: ACTIVE | COMMUNITY

## 2024-09-16 RX ORDER — METHYLPHENIDATE HYDROCHLORIDE 10 MG/1
10 TABLET ORAL
Refills: 0 | Status: ACTIVE | COMMUNITY

## 2024-09-16 RX ORDER — LOSARTAN POTASSIUM 50 MG/1
50 TABLET, FILM COATED ORAL
Qty: 1 | Refills: 0 | Status: ACTIVE | COMMUNITY
Start: 2024-09-16 | End: 1900-01-01

## 2024-09-16 RX ORDER — ATORVASTATIN CALCIUM 80 MG/1
80 TABLET, FILM COATED ORAL
Refills: 0 | Status: ACTIVE | COMMUNITY

## 2024-09-16 NOTE — DISCUSSION/SUMMARY
[FreeTextEntry1] : 59-year-old man presents for initial cardiology office visit status post recent hospitalization for acute CVA. He has residual symptoms of slurred speech. No prior cardiac history. Medical issues include chronic anxiety-depression, ADHD, testosterone deficiency on replacement therapy, psoriasis, active cigarette smoker. Asymptomatic with respect to cardiac issues. On physical examination today, the blood pressure is elevated.  He appears euvolemic.  No new cardiac murmurs rubs or gallops are noted. EKG sinus rhythm, within normal limits. Echocardiogram done during hospitalization demonstrating normal left ventricular systolic function, mild left ventricular hypertrophy. Rule out arrhythmia/atrial fibrillation.  Plan 1.  2-week extended Holter monitor is placed today to evaluate for possible underlying arrhythmia.  If this study is negative, consideration to implantable loop recorder. 2.  Current medication list is reviewed.  He will continue with dual antiplatelet therapy as per neurology for 3 weeks and then will transition to daily aspirin.  Continue with high intensity statin. 3.  Start losartan 50 mg daily for more optimal blood pressure management. 4.  Advised him to check in with his primary physician regarding continuing testosterone replacement therapy  5.  Lifestyle modifications were discussed with him.  He was instructed that he needs to stop smoking cigarettes.  Maintain an active aerobic lifestyle, moderate intensity exercising 4-5 times per week, eating a heart healthy diet was all discussed with him. 6.  He will be following up with neurology. 7.  He anticipates having speech therapy. 8.  Follow-up with me in the office in 2 weeks. 9.  The above was reviewed with him in detail.  All of his questions have been answered to his satisfaction. [EKG obtained to assist in diagnosis and management of assessed problem(s)] : EKG obtained to assist in diagnosis and management of assessed problem(s)

## 2024-09-16 NOTE — REASON FOR VISIT
[FreeTextEntry1] : Initial cardiology office visit status post recent hospitalization for acute CVA.

## 2024-09-16 NOTE — CARDIOLOGY SUMMARY
[de-identified] : Sept 16 2024. Sinus Bradycardia  WITHIN NORMAL LIMITS   [de-identified] : Sept 12 2024. Normal LV function. Mild LVH [de-identified] : September 11, 2024.  CT angio brain stroke protocol.  No acute intracranial hemorrhage, midline shift or hydrocephalus. No evidence of proximal large vessel occlusion. September 12, 2024.  MRI brain.  Findings consistent with acute infarct right corona radiata/centrum semi-Cervantes region.

## 2024-09-16 NOTE — PHYSICAL EXAM
[Normal S1, S2] : normal S1, S2 [No Rub] : no rub [No Gallop] : no gallop [Murmur] : murmur [Normal] : alert and oriented, normal memory [de-identified] : l/Vl systolic

## 2024-09-16 NOTE — HISTORY OF PRESENT ILLNESS
[FreeTextEntry1] : History is obtained from discussion with the patient who is of fair reliability and review of the chart including HIE. The patient is a 59-year-old man who was in his usual state of good health when last week he noted a change in his speech.  He thought that he was slurring his speech.  He went to sleep thinking that it would be better in the morning, however, when he awoke, he still had the symptoms.  A friend advised that he needed to go to the ED immediately for further workup. On arrival to the ED at [facility], he had stroke protocol imaging done.  Initially these findings were negative, and he was admitted to medicine for further workup. He continued to have issues related to his speech.  There was no significant improvement.  He had no other neurologic symptoms.  Evaluated by neurology. Brain MRI was done and this was consistent with acute CVA. He was started on aspirin 81 mg daily, clopidogrel 75 mg daily for 3 weeks and high intensity statin, atorvastatin 80 mg daily and was subsequently discharged home. He has been home for about 1 week now.  Continues to feel as though his speech is "not back to normal".  He denies any other neurologic issues.  Denies any problems related to cognition. Prior to this, he had a active lifestyle. He reported no cardiac complaints.  No complaints of chest pain or chest pressure.  No shortness of breath or dyspnea on exertion.  No palpitations, dizziness, lightheadedness, syncope or near syncope.  No edema, no orthopnea.  No PND.  Past history: No history of CAD, MI, CHF, arrhythmias or heart murmurs.  No prior history of atrial fibrillation. Denied any history of hypertension, vascular disease or hyperlipidemia. He has long history of ADHD and anxiety/depression, follows closely with psychiatrist, Dr. Sandhu. Psoriasis, testosterone deficiency (medications provided by primary care provider, Dr. Berrios) Current medications: Noted below.  Aspirin 81 mg daily, clopidogrel 75 mg daily for 3 weeks, atorvastatin 80 mg daily, Nardil, methylphenidate, testosterone. Allergies: Epinephrine. Social history: Smoker about half pack per day.  No alcohol.  Single.  No children.  He is a  of a local [employer]. Family history: Mother had rheumatic fever, subsequent enlarged heart and infectious endocarditis.

## 2024-09-30 ENCOUNTER — NON-APPOINTMENT (OUTPATIENT)
Age: 59
End: 2024-09-30

## 2024-09-30 ENCOUNTER — APPOINTMENT (OUTPATIENT)
Dept: CARDIOLOGY | Facility: CLINIC | Age: 59
End: 2024-09-30
Payer: COMMERCIAL

## 2024-09-30 VITALS
SYSTOLIC BLOOD PRESSURE: 127 MMHG | HEIGHT: 71 IN | WEIGHT: 212 LBS | RESPIRATION RATE: 19 BRPM | DIASTOLIC BLOOD PRESSURE: 83 MMHG | HEART RATE: 87 BPM | BODY MASS INDEX: 29.68 KG/M2 | OXYGEN SATURATION: 7 %

## 2024-09-30 DIAGNOSIS — R13.10 DYSPHAGIA, UNSPECIFIED: ICD-10-CM

## 2024-09-30 DIAGNOSIS — I10 ESSENTIAL (PRIMARY) HYPERTENSION: ICD-10-CM

## 2024-09-30 DIAGNOSIS — E78.5 HYPERLIPIDEMIA, UNSPECIFIED: ICD-10-CM

## 2024-09-30 PROCEDURE — 93000 ELECTROCARDIOGRAM COMPLETE: CPT

## 2024-09-30 PROCEDURE — 99215 OFFICE O/P EST HI 40 MIN: CPT | Mod: 25

## 2024-09-30 NOTE — PHYSICAL EXAM
[Normal S1, S2] : normal S1, S2 [No Rub] : no rub [No Gallop] : no gallop [Murmur] : murmur [Normal] : alert and oriented, normal memory [de-identified] : l/Vl systolic

## 2024-09-30 NOTE — CARDIOLOGY SUMMARY
[de-identified] : September 30, 2024, sinus rhythm within normal limits.   [de-identified] : Sept 12 2024. Normal LV function. Mild LVH [de-identified] : September 11, 2024.  CT angio brain stroke protocol.  No acute intracranial hemorrhage, midline shift or hydrocephalus. No evidence of proximal large vessel occlusion. September 12, 2024.  MRI brain.  Findings consistent with acute infarct right corona radiata/centrum semi-Cervantes region.

## 2024-09-30 NOTE — DISCUSSION/SUMMARY
[FreeTextEntry1] : 59-year-old status post recent hospitalization for CVA, he has residual deficit of speech difficulty. He has hypertension, hyperlipidemia  Medical issues include chronic anxiety/depression, ADHD, testosterone deficiency on replacement therapy, psoriasis, active cigarette smoker. He has been asymptomatic with respect to cardiac issues. On physical examination today, blood pressure stable.  He is euvolemic.  No new cardiac murmurs rubs or gallops are noted. EKG sinus rhythm, within normal limits.  1.  Current medication list is reviewed.  He was advised dual antiplatelet therapy for 3 weeks postdischarge and then to continue with daily aspirin. 2.  Continue high intensity statin. 3.  Continue with losartan for blood pressure management. 4.  As per his request, I have written a prescription for him to have a modified barium swallow study that speech therapy is requesting.   5.  Follow-up results of extended Holter monitor, tomorrow will complete 2 weeks of monitoring. 6.   is helping him facilitate a new patient appointment with neurology. 7.  Lifestyle modifications including stopping smoking cigarettes, maintaining a more active aerobic lifestyle and eating a heart healthy diet was discussed with him. 8.  Next visit with me in the office in 1 month. 9.  The above was reviewed with him and all of his questions have been answered to his satisfaction. [EKG obtained to assist in diagnosis and management of assessed problem(s)] : EKG obtained to assist in diagnosis and management of assessed problem(s)

## 2024-09-30 NOTE — HISTORY OF PRESENT ILLNESS
[FreeTextEntry1] : Since his last visit with me, he reports feeling extremely fatigued and tired.  Despite getting several hours of sleep, he does feel tired during the course of the day. He has been going to speech therapy and thinks that maybe he is making small improvements. He has not been yet able to establish care with a neurologist.  He states that he is made multiple efforts to get a new patient visit but has not been successful getting 1. He was told by speech therapy that he needs to get a prescription for modified barium swallow study. Continues with current medications, reports compliance. He did follow-up with primary care provider who does not want to make any changes with regards to testosterone replacement that he is on. No chest pain, chest pressure, shortness of breath.  No palpitations.  No dizziness or lightheadedness.  No syncope.  No edema, orthopnea.  No PND.

## 2024-09-30 NOTE — REASON FOR VISIT
[FreeTextEntry1] : Cardiology follow-up visit for evaluation management of hypertension, hyperlipidemia, status post recent hospitalization for CVA.

## 2024-10-09 ENCOUNTER — NON-APPOINTMENT (OUTPATIENT)
Age: 59
End: 2024-10-09

## 2024-10-09 ENCOUNTER — APPOINTMENT (OUTPATIENT)
Dept: NEUROLOGY | Facility: CLINIC | Age: 59
End: 2024-10-09
Payer: COMMERCIAL

## 2024-10-09 VITALS
BODY MASS INDEX: 29.12 KG/M2 | WEIGHT: 208 LBS | SYSTOLIC BLOOD PRESSURE: 121 MMHG | OXYGEN SATURATION: 96 % | TEMPERATURE: 97.9 F | DIASTOLIC BLOOD PRESSURE: 78 MMHG | HEART RATE: 96 BPM | HEIGHT: 71 IN

## 2024-10-09 VITALS
HEIGHT: 71 IN | TEMPERATURE: 97.9 F | DIASTOLIC BLOOD PRESSURE: 78 MMHG | SYSTOLIC BLOOD PRESSURE: 121 MMHG | WEIGHT: 208 LBS | OXYGEN SATURATION: 96 % | HEART RATE: 96 BPM | BODY MASS INDEX: 29.12 KG/M2

## 2024-10-09 DIAGNOSIS — I63.9 CEREBRAL INFARCTION, UNSPECIFIED: ICD-10-CM

## 2024-10-09 PROCEDURE — 99215 OFFICE O/P EST HI 40 MIN: CPT

## 2024-10-09 PROCEDURE — G2211 COMPLEX E/M VISIT ADD ON: CPT | Mod: NC

## 2024-10-11 ENCOUNTER — NON-APPOINTMENT (OUTPATIENT)
Age: 59
End: 2024-10-11

## 2024-10-11 ENCOUNTER — OUTPATIENT (OUTPATIENT)
Dept: OUTPATIENT SERVICES | Facility: HOSPITAL | Age: 59
LOS: 1 days | End: 2024-10-11
Payer: COMMERCIAL

## 2024-10-11 ENCOUNTER — APPOINTMENT (OUTPATIENT)
Dept: RADIOLOGY | Facility: HOSPITAL | Age: 59
End: 2024-10-11

## 2024-10-11 DIAGNOSIS — Z98.890 OTHER SPECIFIED POSTPROCEDURAL STATES: Chronic | ICD-10-CM

## 2024-10-11 DIAGNOSIS — R05.8 OTHER SPECIFIED COUGH: ICD-10-CM

## 2024-10-11 PROCEDURE — 71046 X-RAY EXAM CHEST 2 VIEWS: CPT | Mod: 26

## 2024-10-11 PROCEDURE — 71046 X-RAY EXAM CHEST 2 VIEWS: CPT

## 2024-10-12 ENCOUNTER — NON-APPOINTMENT (OUTPATIENT)
Age: 59
End: 2024-10-12

## 2024-10-18 PROCEDURE — 93248 EXT ECG>7D<15D REV&INTERPJ: CPT

## 2024-10-21 ENCOUNTER — APPOINTMENT (OUTPATIENT)
Dept: RADIOLOGY | Facility: HOSPITAL | Age: 59
End: 2024-10-21
Payer: COMMERCIAL

## 2024-10-21 ENCOUNTER — OUTPATIENT (OUTPATIENT)
Dept: OUTPATIENT SERVICES | Facility: HOSPITAL | Age: 59
LOS: 1 days | Discharge: HOME | End: 2024-10-21

## 2024-10-21 ENCOUNTER — OUTPATIENT (OUTPATIENT)
Dept: OUTPATIENT SERVICES | Facility: HOSPITAL | Age: 59
LOS: 1 days | End: 2024-10-21

## 2024-10-21 ENCOUNTER — APPOINTMENT (OUTPATIENT)
Dept: SPEECH THERAPY | Facility: HOSPITAL | Age: 59
End: 2024-10-21
Payer: COMMERCIAL

## 2024-10-21 DIAGNOSIS — R13.10 DYSPHAGIA, UNSPECIFIED: ICD-10-CM

## 2024-10-21 DIAGNOSIS — Z98.890 OTHER SPECIFIED POSTPROCEDURAL STATES: Chronic | ICD-10-CM

## 2024-10-21 PROCEDURE — 74230 X-RAY XM SWLNG FUNCJ C+: CPT | Mod: 26

## 2024-10-24 ENCOUNTER — OUTPATIENT (OUTPATIENT)
Dept: OUTPATIENT SERVICES | Facility: HOSPITAL | Age: 59
LOS: 1 days | End: 2024-10-24
Payer: COMMERCIAL

## 2024-10-24 ENCOUNTER — APPOINTMENT (OUTPATIENT)
Dept: RADIOLOGY | Facility: HOSPITAL | Age: 59
End: 2024-10-24
Payer: COMMERCIAL

## 2024-10-24 DIAGNOSIS — Z98.890 OTHER SPECIFIED POSTPROCEDURAL STATES: Chronic | ICD-10-CM

## 2024-10-24 DIAGNOSIS — Z00.8 ENCOUNTER FOR OTHER GENERAL EXAMINATION: ICD-10-CM

## 2024-10-24 PROCEDURE — 71046 X-RAY EXAM CHEST 2 VIEWS: CPT | Mod: 26

## 2024-10-24 PROCEDURE — 71046 X-RAY EXAM CHEST 2 VIEWS: CPT

## 2024-10-25 DIAGNOSIS — R13.13 DYSPHAGIA, PHARYNGEAL PHASE: ICD-10-CM

## 2024-10-30 ENCOUNTER — NON-APPOINTMENT (OUTPATIENT)
Age: 59
End: 2024-10-30

## 2024-10-30 ENCOUNTER — APPOINTMENT (OUTPATIENT)
Dept: CARDIOLOGY | Facility: CLINIC | Age: 59
End: 2024-10-30
Payer: COMMERCIAL

## 2024-10-30 VITALS
BODY MASS INDEX: 30.1 KG/M2 | HEIGHT: 71 IN | OXYGEN SATURATION: 96 % | HEART RATE: 106 BPM | DIASTOLIC BLOOD PRESSURE: 78 MMHG | WEIGHT: 215 LBS | SYSTOLIC BLOOD PRESSURE: 116 MMHG

## 2024-10-30 DIAGNOSIS — I10 ESSENTIAL (PRIMARY) HYPERTENSION: ICD-10-CM

## 2024-10-30 DIAGNOSIS — I63.9 CEREBRAL INFARCTION, UNSPECIFIED: ICD-10-CM

## 2024-10-30 DIAGNOSIS — E78.5 HYPERLIPIDEMIA, UNSPECIFIED: ICD-10-CM

## 2024-10-30 PROCEDURE — 99213 OFFICE O/P EST LOW 20 MIN: CPT | Mod: 25

## 2024-10-30 PROCEDURE — 93000 ELECTROCARDIOGRAM COMPLETE: CPT

## 2024-11-06 ENCOUNTER — APPOINTMENT (OUTPATIENT)
Dept: NEUROLOGY | Facility: CLINIC | Age: 59
End: 2024-11-06

## 2024-11-18 ENCOUNTER — APPOINTMENT (OUTPATIENT)
Dept: CARDIOLOGY | Facility: CLINIC | Age: 59
End: 2024-11-18
Payer: COMMERCIAL

## 2024-11-18 PROCEDURE — 93015 CV STRESS TEST SUPVJ I&R: CPT

## 2024-11-26 ENCOUNTER — NON-APPOINTMENT (OUTPATIENT)
Age: 59
End: 2024-11-26

## 2024-11-26 ENCOUNTER — APPOINTMENT (OUTPATIENT)
Dept: OPHTHALMOLOGY | Facility: CLINIC | Age: 59
End: 2024-11-26
Payer: COMMERCIAL

## 2024-11-26 PROCEDURE — 92083 EXTENDED VISUAL FIELD XM: CPT

## 2024-11-26 PROCEDURE — 92133 CPTRZD OPH DX IMG PST SGM ON: CPT

## 2024-11-26 PROCEDURE — 99204 OFFICE O/P NEW MOD 45 MIN: CPT

## 2024-12-10 ENCOUNTER — APPOINTMENT (OUTPATIENT)
Dept: NEUROLOGY | Facility: CLINIC | Age: 59
End: 2024-12-10
Payer: COMMERCIAL

## 2024-12-10 VITALS
SYSTOLIC BLOOD PRESSURE: 147 MMHG | HEART RATE: 91 BPM | WEIGHT: 215 LBS | OXYGEN SATURATION: 95 % | HEIGHT: 71 IN | TEMPERATURE: 97.3 F | DIASTOLIC BLOOD PRESSURE: 88 MMHG | BODY MASS INDEX: 30.1 KG/M2

## 2024-12-10 DIAGNOSIS — I63.9 CEREBRAL INFARCTION, UNSPECIFIED: ICD-10-CM

## 2024-12-10 PROCEDURE — G2211 COMPLEX E/M VISIT ADD ON: CPT | Mod: NC

## 2024-12-10 PROCEDURE — 99214 OFFICE O/P EST MOD 30 MIN: CPT

## 2024-12-12 ENCOUNTER — APPOINTMENT (OUTPATIENT)
Dept: ULTRASOUND IMAGING | Facility: IMAGING CENTER | Age: 59
End: 2024-12-12

## 2024-12-17 RX ORDER — ROSUVASTATIN CALCIUM 40 MG/1
40 TABLET, FILM COATED ORAL DAILY
Qty: 90 | Refills: 1 | Status: ACTIVE | COMMUNITY
Start: 2024-12-17 | End: 1900-01-01

## 2024-12-20 ENCOUNTER — APPOINTMENT (OUTPATIENT)
Dept: ULTRASOUND IMAGING | Facility: IMAGING CENTER | Age: 59
End: 2024-12-20

## 2024-12-29 NOTE — ED PROVIDER NOTE - NEUROLOGICAL, MLM
Interval History: f/u memory loss no acute issues overnight. Encourage oral intake.     Review of Systems  Objective:     Vital Signs (Most Recent):  Temp: 98.4 °F (36.9 °C) (12/29/24 0738)  Pulse: 64 (12/29/24 0738)  Resp: 19 (12/29/24 0738)  BP: 136/62 (12/29/24 0738)  SpO2: 99 % (12/29/24 0738) Vital Signs (24h Range):  Temp:  [97.9 °F (36.6 °C)-98.5 °F (36.9 °C)] 98.4 °F (36.9 °C)  Pulse:  [58-75] 64  Resp:  [17-19] 19  SpO2:  [98 %-99 %] 99 %  BP: (124-138)/(52-69) 136/62     Weight: 44.1 kg (97 lb 3.6 oz)  Body mass index is 16.69 kg/m².    Intake/Output Summary (Last 24 hours) at 12/29/2024 0905  Last data filed at 12/28/2024 1300  Gross per 24 hour   Intake 360 ml   Output --   Net 360 ml         Physical Exam  HENT:      Head: Normocephalic and atraumatic.   Cardiovascular:      Rate and Rhythm: Normal rate and regular rhythm.      Heart sounds: No murmur heard.  Pulmonary:      Effort: Pulmonary effort is normal. No respiratory distress.      Breath sounds: Normal breath sounds. No wheezing.   Abdominal:      General: Bowel sounds are normal. There is no distension.      Palpations: Abdomen is soft.      Tenderness: There is no abdominal tenderness.   Musculoskeletal:         General: No swelling.   Skin:     General: Skin is warm and dry.   Neurological:      Mental Status: She is alert. Mental status is at baseline.             Significant Labs: All pertinent labs within the past 24 hours have been reviewed.        Significant Imaging: I have reviewed all pertinent imaging results/findings within the past 24 hours.   Alert and oriented, no focal deficits, no motor or sensory deficits.

## 2025-01-05 ENCOUNTER — OUTPATIENT (OUTPATIENT)
Dept: OUTPATIENT SERVICES | Facility: HOSPITAL | Age: 60
LOS: 1 days | End: 2025-01-05
Payer: COMMERCIAL

## 2025-01-05 DIAGNOSIS — E03.8 OTHER SPECIFIED HYPOTHYROIDISM: ICD-10-CM

## 2025-01-05 DIAGNOSIS — Z98.890 OTHER SPECIFIED POSTPROCEDURAL STATES: Chronic | ICD-10-CM

## 2025-01-05 DIAGNOSIS — Z00.8 ENCOUNTER FOR OTHER GENERAL EXAMINATION: ICD-10-CM

## 2025-01-05 PROCEDURE — 93975 VASCULAR STUDY: CPT

## 2025-02-03 ENCOUNTER — APPOINTMENT (OUTPATIENT)
Dept: CARDIOLOGY | Facility: CLINIC | Age: 60
End: 2025-02-03

## 2025-02-07 NOTE — ED PROVIDER NOTE - PHYSICAL EXAMINATION
[FreeTextEntry1] : 2/7/2025 RLE arterial duplex no evidence of PSA or hematoma incidental finding subacute nonocclusive dvt in cfv
General:     NAD, well-nourished, well-appearing  Head:     NC/AT, EOMI, oral mucosa moist  Neck:     trachea midline  Lungs:     CTA b/l, no w/r/r  CVS:     S1S2, RRR, no m/g/r  Abd:     +BS, s/nt/nd, no organomegaly  Ext:    2+ radial and pedal pulses, no c/c/e  Neuro: AAOx3, no sensory/motor deficits  Left facial droop slurred speech NIH stroke scale is equal to 2

## 2025-02-18 ENCOUNTER — APPOINTMENT (OUTPATIENT)
Dept: CARDIOLOGY | Facility: CLINIC | Age: 60
End: 2025-02-18

## 2025-03-12 ENCOUNTER — NON-APPOINTMENT (OUTPATIENT)
Age: 60
End: 2025-03-12

## 2025-03-12 ENCOUNTER — APPOINTMENT (OUTPATIENT)
Dept: CARDIOLOGY | Facility: CLINIC | Age: 60
End: 2025-03-12
Payer: COMMERCIAL

## 2025-03-12 VITALS
SYSTOLIC BLOOD PRESSURE: 115 MMHG | WEIGHT: 217 LBS | HEIGHT: 71 IN | DIASTOLIC BLOOD PRESSURE: 74 MMHG | BODY MASS INDEX: 30.38 KG/M2 | RESPIRATION RATE: 18 BRPM | OXYGEN SATURATION: 97 % | HEART RATE: 150 BPM

## 2025-03-12 DIAGNOSIS — I10 ESSENTIAL (PRIMARY) HYPERTENSION: ICD-10-CM

## 2025-03-12 DIAGNOSIS — I63.9 CEREBRAL INFARCTION, UNSPECIFIED: ICD-10-CM

## 2025-03-12 DIAGNOSIS — E78.5 HYPERLIPIDEMIA, UNSPECIFIED: ICD-10-CM

## 2025-03-12 PROCEDURE — 99213 OFFICE O/P EST LOW 20 MIN: CPT | Mod: 25

## 2025-03-12 PROCEDURE — 93000 ELECTROCARDIOGRAM COMPLETE: CPT

## 2025-04-14 ENCOUNTER — APPOINTMENT (OUTPATIENT)
Dept: NEUROLOGY | Facility: CLINIC | Age: 60
End: 2025-04-14
Payer: COMMERCIAL

## 2025-04-22 ENCOUNTER — APPOINTMENT (OUTPATIENT)
Dept: NEUROLOGY | Facility: CLINIC | Age: 60
End: 2025-04-22
Payer: COMMERCIAL

## 2025-04-22 ENCOUNTER — NON-APPOINTMENT (OUTPATIENT)
Age: 60
End: 2025-04-22

## 2025-04-22 VITALS
SYSTOLIC BLOOD PRESSURE: 93 MMHG | WEIGHT: 208 LBS | HEART RATE: 64 BPM | OXYGEN SATURATION: 94 % | HEIGHT: 71 IN | BODY MASS INDEX: 29.12 KG/M2 | TEMPERATURE: 97.9 F | DIASTOLIC BLOOD PRESSURE: 55 MMHG

## 2025-04-22 DIAGNOSIS — I63.9 CEREBRAL INFARCTION, UNSPECIFIED: ICD-10-CM

## 2025-04-22 PROCEDURE — 99214 OFFICE O/P EST MOD 30 MIN: CPT

## 2025-04-22 PROCEDURE — G2211 COMPLEX E/M VISIT ADD ON: CPT | Mod: NC

## 2025-04-22 RX ORDER — IXEKIZUMAB 40 MG/.5ML
INJECTION, SOLUTION SUBCUTANEOUS
Refills: 0 | Status: ACTIVE | COMMUNITY

## 2025-06-04 ENCOUNTER — NON-APPOINTMENT (OUTPATIENT)
Age: 60
End: 2025-06-04

## 2025-06-09 ENCOUNTER — APPOINTMENT (OUTPATIENT)
Dept: CARDIOLOGY | Facility: CLINIC | Age: 60
End: 2025-06-09
Payer: COMMERCIAL

## 2025-06-09 VITALS
SYSTOLIC BLOOD PRESSURE: 128 MMHG | HEART RATE: 89 BPM | RESPIRATION RATE: 19 BRPM | BODY MASS INDEX: 29.12 KG/M2 | HEIGHT: 71 IN | OXYGEN SATURATION: 94 % | WEIGHT: 208 LBS | DIASTOLIC BLOOD PRESSURE: 75 MMHG

## 2025-06-09 PROCEDURE — 93000 ELECTROCARDIOGRAM COMPLETE: CPT

## 2025-06-09 PROCEDURE — 99215 OFFICE O/P EST HI 40 MIN: CPT | Mod: 25

## 2025-06-13 LAB
25(OH)D3 SERPL-MCNC: 12.2 NG/ML
ALBUMIN SERPL ELPH-MCNC: 4.5 G/DL
ALP BLD-CCNC: 106 U/L
ALT SERPL-CCNC: 19 U/L
ANION GAP SERPL CALC-SCNC: 20 MMOL/L
AST SERPL-CCNC: 29 U/L
BILIRUB SERPL-MCNC: 0.7 MG/DL
BUN SERPL-MCNC: 29 MG/DL
CALCIUM SERPL-MCNC: 9.1 MG/DL
CHLORIDE SERPL-SCNC: 100 MMOL/L
CHOLEST SERPL-MCNC: 188 MG/DL
CK SERPL-CCNC: 111 U/L
CO2 SERPL-SCNC: 20 MMOL/L
CREAT SERPL-MCNC: 1.35 MG/DL
EGFRCR SERPLBLD CKD-EPI 2021: 60 ML/MIN/1.73M2
ESTIMATED AVERAGE GLUCOSE: 97 MG/DL
GLUCOSE SERPL-MCNC: 65 MG/DL
HBA1C MFR BLD HPLC: 5 %
HCT VFR BLD CALC: 39.9 %
HDLC SERPL-MCNC: 43 MG/DL
HGB BLD-MCNC: 12.8 G/DL
LDLC SERPL-MCNC: 107 MG/DL
MCHC RBC-ENTMCNC: 31.9 PG
MCHC RBC-ENTMCNC: 32.1 G/DL
MCV RBC AUTO: 99.5 FL
NONHDLC SERPL-MCNC: 145 MG/DL
PLATELET # BLD AUTO: 217 K/UL
POTASSIUM SERPL-SCNC: 4.7 MMOL/L
PROT SERPL-MCNC: 6.7 G/DL
RBC # BLD: 4.01 M/UL
RBC # FLD: 14.6 %
SODIUM SERPL-SCNC: 140 MMOL/L
TESTOST SERPL-MCNC: 137 NG/DL
TRIGL SERPL-MCNC: 215 MG/DL
TSH SERPL-ACNC: 2.26 UIU/ML
WBC # FLD AUTO: 10.25 K/UL

## 2025-06-13 RX ORDER — PRAVASTATIN SODIUM 40 MG/1
40 TABLET ORAL
Qty: 90 | Refills: 0 | Status: ACTIVE | COMMUNITY
Start: 2025-06-13 | End: 1900-01-01

## 2025-06-18 ENCOUNTER — NON-APPOINTMENT (OUTPATIENT)
Age: 60
End: 2025-06-18

## 2025-06-26 ENCOUNTER — APPOINTMENT (OUTPATIENT)
Dept: NEUROLOGY | Facility: CLINIC | Age: 60
End: 2025-06-26
Payer: COMMERCIAL

## 2025-06-26 VITALS
BODY MASS INDEX: 29.12 KG/M2 | TEMPERATURE: 97.9 F | WEIGHT: 208 LBS | HEART RATE: 79 BPM | DIASTOLIC BLOOD PRESSURE: 74 MMHG | OXYGEN SATURATION: 94 % | SYSTOLIC BLOOD PRESSURE: 114 MMHG | HEIGHT: 71 IN

## 2025-06-26 VITALS
WEIGHT: 208 LBS | OXYGEN SATURATION: 94 % | HEART RATE: 79 BPM | HEIGHT: 71 IN | TEMPERATURE: 97.9 F | BODY MASS INDEX: 29.12 KG/M2 | SYSTOLIC BLOOD PRESSURE: 114 MMHG | DIASTOLIC BLOOD PRESSURE: 74 MMHG

## 2025-06-26 PROBLEM — G47.52 REM SLEEP BEHAVIOR DISORDER: Status: ACTIVE | Noted: 2025-06-26

## 2025-06-26 PROCEDURE — 99214 OFFICE O/P EST MOD 30 MIN: CPT

## 2025-06-26 PROCEDURE — G2211 COMPLEX E/M VISIT ADD ON: CPT | Mod: NC

## 2025-07-02 ENCOUNTER — APPOINTMENT (OUTPATIENT)
Dept: NEUROLOGY | Facility: CLINIC | Age: 60
End: 2025-07-02
Payer: COMMERCIAL

## 2025-07-02 PROCEDURE — 95816 EEG AWAKE AND DROWSY: CPT

## 2025-07-07 ENCOUNTER — NON-APPOINTMENT (OUTPATIENT)
Age: 60
End: 2025-07-07

## 2025-07-21 ENCOUNTER — NON-APPOINTMENT (OUTPATIENT)
Age: 60
End: 2025-07-21

## 2025-09-08 ENCOUNTER — RX RENEWAL (OUTPATIENT)
Age: 60
End: 2025-09-08